# Patient Record
Sex: FEMALE | Race: WHITE | NOT HISPANIC OR LATINO | Employment: PART TIME | URBAN - METROPOLITAN AREA
[De-identification: names, ages, dates, MRNs, and addresses within clinical notes are randomized per-mention and may not be internally consistent; named-entity substitution may affect disease eponyms.]

---

## 2021-03-22 ENCOUNTER — BULK ORDERING (OUTPATIENT)
Dept: CASE MANAGEMENT | Facility: OTHER | Age: 61
End: 2021-03-22

## 2021-03-22 DIAGNOSIS — Z23 IMMUNIZATION DUE: ICD-10-CM

## 2021-05-21 ENCOUNTER — OFFICE VISIT (OUTPATIENT)
Dept: FAMILY MEDICINE CLINIC | Facility: CLINIC | Age: 61
End: 2021-05-21

## 2021-05-21 ENCOUNTER — HOSPITAL ENCOUNTER (OUTPATIENT)
Dept: GENERAL RADIOLOGY | Facility: HOSPITAL | Age: 61
Discharge: HOME | End: 2021-05-21

## 2021-05-21 VITALS
OXYGEN SATURATION: 100 % | BODY MASS INDEX: 25.76 KG/M2 | HEIGHT: 68 IN | DIASTOLIC BLOOD PRESSURE: 70 MMHG | SYSTOLIC BLOOD PRESSURE: 110 MMHG | RESPIRATION RATE: 16 BRPM | TEMPERATURE: 98.4 F | WEIGHT: 170 LBS | HEART RATE: 72 BPM

## 2021-05-21 DIAGNOSIS — R10.10 PAIN OF UPPER ABDOMEN: ICD-10-CM

## 2021-05-21 DIAGNOSIS — F10.20 ALCOHOLIC (HCC): Primary | ICD-10-CM

## 2021-05-21 DIAGNOSIS — R05.9 COUGH: ICD-10-CM

## 2021-05-21 DIAGNOSIS — F39 MOOD DISORDER (HCC): ICD-10-CM

## 2021-05-21 DIAGNOSIS — F17.219 CIGARETTE NICOTINE DEPENDENCE WITH NICOTINE-INDUCED DISORDER: ICD-10-CM

## 2021-05-21 DIAGNOSIS — R14.0 ABDOMINAL DISTENTION: ICD-10-CM

## 2021-05-21 DIAGNOSIS — R14.0 BLOATING: ICD-10-CM

## 2021-05-21 DIAGNOSIS — Z11.59 NEED FOR HEPATITIS C SCREENING TEST: ICD-10-CM

## 2021-05-21 PROBLEM — M85.80 OSTEOPENIA: Status: ACTIVE | Noted: 2021-05-21

## 2021-05-21 PROCEDURE — 74019 RADEX ABDOMEN 2 VIEWS: CPT

## 2021-05-21 PROCEDURE — 99204 OFFICE O/P NEW MOD 45 MIN: CPT | Performed by: NURSE PRACTITIONER

## 2021-05-21 PROCEDURE — 71046 X-RAY EXAM CHEST 2 VIEWS: CPT

## 2021-05-21 NOTE — PATIENT INSTRUCTIONS
Discussed AA and JDAC, given numbers to both  Need to slow down alcohol intake and then stop alcohol intake  Discussed depression and sleep issues, will need to further discuss and start treatment; need to seek therapist  Will need to have fasting labs and imaging done for full evaluation.   Discussed smoking cessation and possible medications  Need to follow up after completion of labs and imaging for review of all.  Pt verb. Understanding.

## 2021-05-21 NOTE — PROGRESS NOTES
Oni Stack is a 60 y.o.. female.     Pt here today to become established.     Pt here with c/o bloating and abd. Pain. Pt here with c/o depression and sleep issues. Pt admits to drinking alcohol daily. Pt stating some days she drinks one bottle of wine by herself. Pt stating some times sh drinks burbon. Pt stating she has had an issue with alcohol for some time. Pt stating her brother suddenly passed about 6 months ago and she has been feeling more depressed since then. Pt stating she has noticed she has been gaining weight and is concerned. Pt stating she feels lonely since her brother has .     21 Cologaurd negative  2020 mammogram negative  2020 dexa scan done, showed osteopenia   2021 last GYN appt.       The following portions of the patient's history were reviewed and updated as appropriate: allergies, current medications, past family history, past medical history, past social history, past surgical history and problem list.    Past Medical History:   Diagnosis Date   • Alcoholic (CMS/[facility]) 2021   • Cigarette nicotine dependence with nicotine-induced disorder 2021   • Mood disorder (CMS/HCC) 2021   • Osteopenia 2021       Past Surgical History:   Procedure Laterality Date   •  SECTION             Review of Systems   Constitutional: Negative for activity change, appetite change and fatigue.   HENT: Positive for congestion. Negative for ear pain, rhinorrhea and sore throat.    Respiratory: Positive for cough. Negative for shortness of breath.    Cardiovascular: Negative for chest pain and palpitations.   Gastrointestinal: Positive for abdominal pain. Negative for blood in stool, diarrhea, nausea and vomiting.   Genitourinary: Negative.  Negative for dysuria, frequency, hematuria and urgency.   Neurological: Negative for dizziness and headaches.   Psychiatric/Behavioral: Positive for dysphoric mood and sleep disturbance. Negative for decreased  "concentration. The patient is not nervous/anxious.        No Known Allergies    Objective     Vitals:    05/21/21 1443   BP: 110/70   Pulse: 72   Resp: 16   Temp: 98.4 °F (36.9 °C)   TempSrc: Oral   SpO2: 100%   Weight: 77.1 kg (170 lb)   Height: 172.7 cm (68\")     Body mass index is 25.85 kg/m².    Physical Exam  Vitals reviewed.   HENT:      Head: Normocephalic.   Eyes:      Pupils: Pupils are equal, round, and reactive to light.   Cardiovascular:      Rate and Rhythm: Normal rate and regular rhythm.   Pulmonary:      Effort: Pulmonary effort is normal.      Breath sounds: Normal breath sounds.   Abdominal:      General: Bowel sounds are normal. There is distension.      Palpations: Abdomen is soft. There is no hepatomegaly or splenomegaly.      Tenderness: There is no abdominal tenderness. There is no guarding or rebound. Negative signs include McBurney's sign.      Hernia: No hernia is present.   Musculoskeletal:         General: Normal range of motion.   Skin:     General: Skin is warm and dry.   Neurological:      Mental Status: She is alert and oriented to person, place, and time.         No current outpatient medications on file.      Assessment/Plan   Diagnoses and all orders for this visit:    1. Alcoholic (CMS/HCC) (Primary)    2. Pain of upper abdomen  -     CBC & Differential  -     Comprehensive metabolic panel  -     Lipid Panel  -     Urine Culture - Urine, Urine, Clean Catch  -     Amylase  -     Lipase  -     Hemoglobin A1c  -     XR abdomen flat and upright; Future    3. Bloating    4. Abdominal distention    5. Mood disorder (CMS/HCC)  -     Vitamin D 25 Hydroxy  -     Vitamin B12  -     TSH    6. Cigarette nicotine dependence with nicotine-induced disorder  -     XR Chest PA & Lateral; Future    7. Cough  -     XR Chest PA & Lateral; Future    8. Need for hepatitis C screening test  -     Hepatitis C antibody        Patient Instructions   Discussed AA and JDAC, given numbers to both  Need to " slow down alcohol intake and then stop alcohol intake  Discussed depression and sleep issues, will need to further discuss and start treatment; need to seek therapist  Will need to have fasting labs and imaging done for full evaluation.   Discussed smoking cessation and possible medications  Need to follow up after completion of labs and imaging for review of all.  Pt verb. Understanding.       Return for schedule fasting labs (already placed in computer); follow up in one week.

## 2021-05-27 LAB
25(OH)D3+25(OH)D2 SERPL-MCNC: 28.4 NG/ML (ref 30–100)
ALBUMIN SERPL-MCNC: 4.1 G/DL (ref 3.5–5.2)
ALBUMIN/GLOB SERPL: 2.1 G/DL
ALP SERPL-CCNC: 81 U/L (ref 39–117)
ALT SERPL-CCNC: 22 U/L (ref 1–33)
AMYLASE SERPL-CCNC: 45 U/L (ref 28–100)
AST SERPL-CCNC: 16 U/L (ref 1–32)
BACTERIA UR CULT: NORMAL
BACTERIA UR CULT: NORMAL
BASOPHILS # BLD AUTO: 0.08 10*3/MM3 (ref 0–0.2)
BASOPHILS NFR BLD AUTO: 0.8 % (ref 0–1.5)
BILIRUB SERPL-MCNC: 0.6 MG/DL (ref 0–1.2)
BUN SERPL-MCNC: 13 MG/DL (ref 8–23)
BUN/CREAT SERPL: 21.3 (ref 7–25)
CALCIUM SERPL-MCNC: 9.2 MG/DL (ref 8.6–10.5)
CHLORIDE SERPL-SCNC: 105 MMOL/L (ref 98–107)
CHOLEST SERPL-MCNC: 216 MG/DL (ref 0–200)
CO2 SERPL-SCNC: 23.2 MMOL/L (ref 22–29)
CREAT SERPL-MCNC: 0.61 MG/DL (ref 0.57–1)
EOSINOPHIL # BLD AUTO: 0.22 10*3/MM3 (ref 0–0.4)
EOSINOPHIL NFR BLD AUTO: 2.3 % (ref 0.3–6.2)
ERYTHROCYTE [DISTWIDTH] IN BLOOD BY AUTOMATED COUNT: 12.2 % (ref 12.3–15.4)
GLOBULIN SER CALC-MCNC: 2 GM/DL
GLUCOSE SERPL-MCNC: 103 MG/DL (ref 65–99)
HBA1C MFR BLD: 4.8 % (ref 4.8–5.6)
HCT VFR BLD AUTO: 42.9 % (ref 34–46.6)
HCV AB S/CO SERPL IA: <0.1 S/CO RATIO (ref 0–0.9)
HDLC SERPL-MCNC: 51 MG/DL (ref 40–60)
HGB BLD-MCNC: 14 G/DL (ref 12–15.9)
IMM GRANULOCYTES # BLD AUTO: 0.03 10*3/MM3 (ref 0–0.05)
IMM GRANULOCYTES NFR BLD AUTO: 0.3 % (ref 0–0.5)
LDLC SERPL CALC-MCNC: 145 MG/DL (ref 0–100)
LIPASE SERPL-CCNC: 34 U/L (ref 13–60)
LYMPHOCYTES # BLD AUTO: 2.5 10*3/MM3 (ref 0.7–3.1)
LYMPHOCYTES NFR BLD AUTO: 25.7 % (ref 19.6–45.3)
MCH RBC QN AUTO: 32.4 PG (ref 26.6–33)
MCHC RBC AUTO-ENTMCNC: 32.6 G/DL (ref 31.5–35.7)
MCV RBC AUTO: 99.3 FL (ref 79–97)
MONOCYTES # BLD AUTO: 0.69 10*3/MM3 (ref 0.1–0.9)
MONOCYTES NFR BLD AUTO: 7.1 % (ref 5–12)
NEUTROPHILS # BLD AUTO: 6.19 10*3/MM3 (ref 1.7–7)
NEUTROPHILS NFR BLD AUTO: 63.8 % (ref 42.7–76)
NRBC BLD AUTO-RTO: 0 /100 WBC (ref 0–0.2)
PLATELET # BLD AUTO: 298 10*3/MM3 (ref 140–450)
POTASSIUM SERPL-SCNC: 4.2 MMOL/L (ref 3.5–5.2)
PROT SERPL-MCNC: 6.1 G/DL (ref 6–8.5)
RBC # BLD AUTO: 4.32 10*6/MM3 (ref 3.77–5.28)
SODIUM SERPL-SCNC: 138 MMOL/L (ref 136–145)
TRIGL SERPL-MCNC: 112 MG/DL (ref 0–150)
TSH SERPL DL<=0.005 MIU/L-ACNC: 3.05 UIU/ML (ref 0.27–4.2)
VIT B12 SERPL-MCNC: 293 PG/ML (ref 211–946)
VLDLC SERPL CALC-MCNC: 20 MG/DL (ref 5–40)
WBC # BLD AUTO: 9.71 10*3/MM3 (ref 3.4–10.8)

## 2021-05-28 ENCOUNTER — OFFICE VISIT (OUTPATIENT)
Dept: FAMILY MEDICINE CLINIC | Facility: CLINIC | Age: 61
End: 2021-05-28

## 2021-05-28 VITALS
OXYGEN SATURATION: 99 % | BODY MASS INDEX: 25.61 KG/M2 | WEIGHT: 169 LBS | RESPIRATION RATE: 18 BRPM | HEART RATE: 76 BPM | SYSTOLIC BLOOD PRESSURE: 118 MMHG | HEIGHT: 68 IN | TEMPERATURE: 97.1 F | DIASTOLIC BLOOD PRESSURE: 62 MMHG

## 2021-05-28 DIAGNOSIS — Z71.6 ENCOUNTER FOR SMOKING CESSATION COUNSELING: ICD-10-CM

## 2021-05-28 DIAGNOSIS — F10.20 ALCOHOLIC (HCC): Primary | ICD-10-CM

## 2021-05-28 DIAGNOSIS — F39 MOOD DISORDER (HCC): ICD-10-CM

## 2021-05-28 DIAGNOSIS — E55.9 VITAMIN D DEFICIENCY: ICD-10-CM

## 2021-05-28 DIAGNOSIS — E78.2 MIXED HYPERLIPIDEMIA: ICD-10-CM

## 2021-05-28 PROCEDURE — 99213 OFFICE O/P EST LOW 20 MIN: CPT | Performed by: NURSE PRACTITIONER

## 2021-05-28 RX ORDER — VARENICLINE TARTRATE 1 MG/1
1 TABLET, FILM COATED ORAL 2 TIMES DAILY
Qty: 56 TABLET | Refills: 4 | Status: SHIPPED | OUTPATIENT
Start: 2021-06-25 | End: 2021-11-12

## 2021-05-28 RX ORDER — ERGOCALCIFEROL 1.25 MG/1
50000 CAPSULE ORAL WEEKLY
Qty: 5 CAPSULE | Refills: 2 | Status: SHIPPED | OUTPATIENT
Start: 2021-05-28 | End: 2021-06-22 | Stop reason: SDUPTHER

## 2021-06-01 PROBLEM — E55.9 VITAMIN D DEFICIENCY: Status: ACTIVE | Noted: 2021-06-01

## 2021-06-01 PROBLEM — E78.2 MIXED HYPERLIPIDEMIA: Status: ACTIVE | Noted: 2021-06-01

## 2021-06-01 NOTE — PATIENT INSTRUCTIONS
Discussed all recent labs with pt in office today.    Alcoholic/mood disorder: Discussed Jdac/[facility] support [facility]; discussed medication-pt declined at this time; will re-discuss in 3 months.     HLD: need to work on diet, decrease saturated fats, increase exercise, increase water intake.    Vitamin D def.: Vitamin D supplement, will recheck in 3 months    Smoking cessation: Discussed Chantix, how to take, discussed smoking cessation

## 2021-06-22 DIAGNOSIS — E55.9 VITAMIN D DEFICIENCY: ICD-10-CM

## 2021-06-22 RX ORDER — ERGOCALCIFEROL 1.25 MG/1
50000 CAPSULE ORAL WEEKLY
Qty: 8 CAPSULE | Refills: 2 | Status: SHIPPED | OUTPATIENT
Start: 2021-06-22 | End: 2021-10-07 | Stop reason: SDUPTHER

## 2021-06-22 NOTE — TELEPHONE ENCOUNTER
Caller: Janel Stack    Relationship: Self    Best call back number: [phone]    Medication needed:   Requested Prescriptions     Pending Prescriptions Disp Refills   • vitamin D (ERGOCALCIFEROL) 1.25 MG (63730 UT) capsule capsule 5 capsule 2     Sig: Take 1 capsule by mouth 1 (One) Time Per Week.       When do you need the refill by: 6/22/21    What additional details did the patient provide when requesting the medication: PATIENT IS OUT OF MEDICATION. SHE ALSO ASKED IF SHE COULD GET A 2 MONTH SUPPLY OF VITAMIN D    Does the patient have less than a 3 day supply:  [x] Yes  [] No    What is the patient's preferred pharmacy: [facility] #[id number] [facility] [address] [address] AT [facility] [phone] [facility] [phone]

## 2021-07-16 ENCOUNTER — TELEPHONE (OUTPATIENT)
Dept: FAMILY MEDICINE CLINIC | Facility: CLINIC | Age: 61
End: 2021-07-16

## 2021-07-22 NOTE — TELEPHONE ENCOUNTER
7/22/21-3RD ATTEMPT-CALLED PT AND LEFT VM TO CB PER PT NEEDS APPT.    HUB TO READ:    PT NEEDS APPT WITH PCP FOR BACK PAIN ISSUE

## 2021-10-07 DIAGNOSIS — E55.9 VITAMIN D DEFICIENCY: ICD-10-CM

## 2021-10-07 RX ORDER — ERGOCALCIFEROL 1.25 MG/1
50000 CAPSULE ORAL WEEKLY
Qty: 8 CAPSULE | Refills: 2 | Status: SHIPPED | OUTPATIENT
Start: 2021-10-07 | End: 2022-08-19

## 2021-10-07 NOTE — TELEPHONE ENCOUNTER
Incoming Refill Request      Medication requested (name and dose): vitamin D (ERGOCALCIFEROL) 1.25 MG (25567 UT) capsule capsule    Pharmacy where request should be sent: [facility] #[id number] - [facility] [address] [address] AT [facility] [phone] [facility] [phone]

## 2021-10-07 NOTE — TELEPHONE ENCOUNTER
Florencia,     Please review and refill if appropriate.     Last OV 05/28/2021  Next OV none scheduled      Thanks,  Deven

## 2021-10-21 ENCOUNTER — TELEPHONE (OUTPATIENT)
Dept: FAMILY MEDICINE CLINIC | Facility: CLINIC | Age: 61
End: 2021-10-21

## 2021-10-21 NOTE — TELEPHONE ENCOUNTER
Caller: Janel Stack    Relationship: Self    Best call back number: [phone]    What medication are you requesting: VALIUM       Have you had these symptoms before:    [x] Yes  [] No    Have you been treated for these symptoms before:   [x] Yes  [] No    If a prescription is needed, what is your preferred pharmacy and phone number: [facility] #[id number] [facility] [address] [facility] AT [facility] [phone] [facility] [phone]      Additional notes: PATIENT STATES SHE IS HAVING A MOLAR REMOVED TOMORROW AT THE DENTIST AND HE ALWAYS RECOMMENDS HER PCP TO PRESCRIBE HER VALIUM FOR HER NERVES

## 2021-10-22 NOTE — TELEPHONE ENCOUNTER
If pt needing valium she will have to discuss with her dentist; if she is requesting us to provide medication she needs to come into office for appt. To discuss.   Florencia

## 2021-12-20 ENCOUNTER — TELEPHONE (OUTPATIENT)
Dept: FAMILY MEDICINE CLINIC | Facility: CLINIC | Age: 61
End: 2021-12-20

## 2021-12-20 NOTE — TELEPHONE ENCOUNTER
Caller: Janel Stack    Relationship: Self    Best call back number: [phone]    Requested Prescriptions:    ANTIBIOTIC (CLENDAMYACIN)  Pharmacy where request should be sent:      Additional details provided by patient: PATIENT IS CALLING TO STATE HER DENTIST PRESCRIBED THE ABOVE MEDICATION FOR AN INFECTED TOOTH THAT NEEDS TO BE PULLED.  SHE STATES IT IS GOING TO COST OVER $100 DUE TO THE FACT HER DENTIST IS NOT IN THE [facility] NETWORK.  SHE IS WANTING TO KNOW IF MS DOMINGO WOULD BE WILLING TO PRESCRIBE THE MEDICATION BECAUSE SHE IS IN NETWORK.    [facility] #[id number] [place] - [address] [address] AT [facility] - [phone] [place] [phone] [place]  [phone]    Does the patient have less than a 3 day supply:  [x] Yes  [] No    Va Jose, [employer] Rep   12/20/21 14:59 EST     PLEASE ADVISE.

## 2021-12-21 NOTE — TELEPHONE ENCOUNTER
CALLED PT AND LEFT VM TO CB PER NEEDS APPT FOR MEDICATION REQUEST    HUB TO READ:    PT WILL NEED APPT FOR ANY NEW MEDICATIONS

## 2022-01-03 ENCOUNTER — OFFICE VISIT (OUTPATIENT)
Dept: FAMILY MEDICINE CLINIC | Facility: CLINIC | Age: 62
End: 2022-01-03

## 2022-01-03 VITALS
HEART RATE: 68 BPM | SYSTOLIC BLOOD PRESSURE: 101 MMHG | WEIGHT: 68.6 LBS | HEIGHT: 68 IN | TEMPERATURE: 96.4 F | OXYGEN SATURATION: 99 % | RESPIRATION RATE: 18 BRPM | DIASTOLIC BLOOD PRESSURE: 58 MMHG | BODY MASS INDEX: 10.4 KG/M2

## 2022-01-03 DIAGNOSIS — F41.9 ANXIETY SYNDROME: Primary | ICD-10-CM

## 2022-01-03 PROCEDURE — 99213 OFFICE O/P EST LOW 20 MIN: CPT | Performed by: NURSE PRACTITIONER

## 2022-01-03 RX ORDER — DIAZEPAM 2 MG/1
2 TABLET ORAL 2 TIMES DAILY PRN
Qty: 2 TABLET | Refills: 0 | Status: SHIPPED | OUTPATIENT
Start: 2022-01-03 | End: 2022-08-19

## 2022-01-03 RX ORDER — CLINDAMYCIN HYDROCHLORIDE 300 MG/1
CAPSULE ORAL
COMMUNITY
Start: 2021-12-21 | End: 2022-08-19

## 2022-01-03 NOTE — PATIENT INSTRUCTIONS
ludwig pulled, reviewed and appropriate  Discussed that this is not a medication that will be prescribed for anxiety on a normal basis; that this is to help with her dental procedure anxiety. Pt informed of possible adverse effects/side effects of medication. Pt informed to make sure her dentist is aware of script and if she takes medication. Pt verb. Understanding.

## 2022-01-03 NOTE — PROGRESS NOTES
"Oni Stack is a 61 y.o.. female.     Pt here today with request for a script for valium for dental procedure anxiety. Pt stating she is having her right molar pulled on 21.       The following portions of the patient's history were reviewed and updated as appropriate: allergies, current medications, past family history, past medical history, past social history, past surgical history and problem list.    Past Medical History:   Diagnosis Date   • Alcoholic (CMS/HCC) 2021   • Cigarette nicotine dependence with nicotine-induced disorder 2021   • Mood disorder (CMS/HCC) 2021   • Osteopenia 2021       Past Surgical History:   Procedure Laterality Date   •  SECTION             Review of Systems   Constitutional: Negative.    Respiratory: Negative.    Cardiovascular: Negative.        No Known Allergies    Objective     Vitals:    22 1511   BP: 101/58   BP Location: Left arm   Patient Position: Sitting   Pulse: 68   Resp: 18   Temp: 96.4 °F (35.8 °C)   TempSrc: Oral   SpO2: 99%   Weight: 31.1 kg (68 lb 9.6 oz)   Height: 172.7 cm (67.99\")     Body mass index is 10.43 kg/m².    Physical Exam  Vitals reviewed.   HENT:      Head: Normocephalic.   Eyes:      Pupils: Pupils are equal, round, and reactive to light.   Cardiovascular:      Rate and Rhythm: Normal rate and regular rhythm.   Pulmonary:      Effort: Pulmonary effort is normal.      Breath sounds: Normal breath sounds.   Musculoskeletal:         General: Normal range of motion.   Neurological:      Mental Status: She is alert and oriented to person, place, and time.   Psychiatric:         Behavior: Behavior normal.         Current Outpatient Medications:   •  clindamycin (CLEOCIN) 300 MG capsule, , Disp: , Rfl:   •  diazePAM (Valium) 2 MG tablet, Take 1 tablet by mouth 2 (Two) Times a Day As Needed for Anxiety., Disp: 2 tablet, Rfl: 0  •  vitamin D (ERGOCALCIFEROL) 1.25 MG (15578 UT) capsule capsule, Take 1 " capsule by mouth 1 (One) Time Per Week., Disp: 8 capsule, Rfl: 2      Assessment/Plan   Diagnoses and all orders for this visit:    1. Anxiety syndrome (Primary)  Comments:  dental procedure, haviing molar pulled  Orders:  -     diazePAM (Valium) 2 MG tablet; Take 1 tablet by mouth 2 (Two) Times a Day As Needed for Anxiety.  Dispense: 2 tablet; Refill: 0        Patient Instructions   ludwig pulled, reviewed and appropriate  Discussed that this is not a medication that will be prescribed for anxiety on a normal basis; that this is to help with her dental procedure anxiety. Pt informed of possible adverse effects/side effects of medication. Pt informed to make sure her dentist is aware of script and if she takes medication. Pt verb. Understanding.       Return for follow up as needed/as recommended.

## 2022-08-18 ENCOUNTER — OFFICE VISIT (OUTPATIENT)
Dept: FAMILY MEDICINE CLINIC | Facility: CLINIC | Age: 62
End: 2022-08-18

## 2022-08-19 ENCOUNTER — TELEPHONE (OUTPATIENT)
Dept: FAMILY MEDICINE CLINIC | Facility: CLINIC | Age: 62
End: 2022-08-19

## 2022-08-19 ENCOUNTER — OFFICE VISIT (OUTPATIENT)
Dept: FAMILY MEDICINE CLINIC | Facility: CLINIC | Age: 62
End: 2022-08-19

## 2022-08-19 VITALS
SYSTOLIC BLOOD PRESSURE: 126 MMHG | BODY MASS INDEX: 25.31 KG/M2 | DIASTOLIC BLOOD PRESSURE: 70 MMHG | RESPIRATION RATE: 18 BRPM | OXYGEN SATURATION: 98 % | HEIGHT: 68 IN | TEMPERATURE: 98 F | WEIGHT: 167 LBS | HEART RATE: 65 BPM

## 2022-08-19 DIAGNOSIS — F10.20 ALCOHOLIC: ICD-10-CM

## 2022-08-19 DIAGNOSIS — G89.29 CHRONIC BILATERAL LOW BACK PAIN WITH BILATERAL SCIATICA: ICD-10-CM

## 2022-08-19 DIAGNOSIS — M54.42 CHRONIC BILATERAL LOW BACK PAIN WITH BILATERAL SCIATICA: ICD-10-CM

## 2022-08-19 DIAGNOSIS — Z00.00 ANNUAL PHYSICAL EXAM: Primary | ICD-10-CM

## 2022-08-19 DIAGNOSIS — F41.9 ANXIETY SYNDROME: ICD-10-CM

## 2022-08-19 DIAGNOSIS — M54.41 CHRONIC BILATERAL LOW BACK PAIN WITH BILATERAL SCIATICA: ICD-10-CM

## 2022-08-19 PROCEDURE — 99396 PREV VISIT EST AGE 40-64: CPT | Performed by: NURSE PRACTITIONER

## 2022-08-19 RX ORDER — TIZANIDINE HYDROCHLORIDE 2 MG/1
2 CAPSULE, GELATIN COATED ORAL 3 TIMES DAILY PRN
Qty: 30 CAPSULE | Refills: 0 | Status: SHIPPED | OUTPATIENT
Start: 2022-08-19 | End: 2022-08-19 | Stop reason: SDUPTHER

## 2022-08-19 RX ORDER — ERGOCALCIFEROL (VITAMIN D2) 10 MCG
400 TABLET ORAL DAILY
COMMUNITY

## 2022-08-19 RX ORDER — METHYLPREDNISOLONE 4 MG/1
TABLET ORAL
Qty: 1 EACH | Refills: 0 | Status: SHIPPED | OUTPATIENT
Start: 2022-08-19 | End: 2022-08-19 | Stop reason: SDUPTHER

## 2022-08-19 RX ORDER — METHYLPREDNISOLONE 4 MG/1
TABLET ORAL
Qty: 1 EACH | Refills: 0 | Status: SHIPPED | OUTPATIENT
Start: 2022-08-19 | End: 2023-01-04

## 2022-08-19 RX ORDER — TIZANIDINE HYDROCHLORIDE 2 MG/1
2 CAPSULE, GELATIN COATED ORAL 3 TIMES DAILY PRN
Qty: 30 CAPSULE | Refills: 0 | Status: SHIPPED | OUTPATIENT
Start: 2022-08-19 | End: 2022-08-23

## 2022-08-19 NOTE — TELEPHONE ENCOUNTER
Caller: Janel Stack    Relationship: Self    Best call back number: [phone]     What medications are you currently taking:   Current Outpatient Medications on File Prior to Visit   Medication Sig Dispense Refill   • methylPREDNISolone (MEDROL) 4 MG dose pack Take as directed on package instructions. 1 each 0   • TiZANidine (Zanaflex) 2 MG capsule Take 1 capsule by mouth 3 (Three) Times a Day As Needed for Muscle Spasms. 30 capsule 0   • Vitamin D, Cholecalciferol, (CHOLECALCIFEROL) 10 MCG (400 UNIT) tablet Take 400 Units by mouth Daily.     • [DISCONTINUED] clindamycin (CLEOCIN) 300 MG capsule      • [DISCONTINUED] diazePAM (Valium) 2 MG tablet Take 1 tablet by mouth 2 (Two) Times a Day As Needed for Anxiety. 2 tablet 0   • [DISCONTINUED] methylPREDNISolone (MEDROL) 4 MG dose pack Take as directed on package instructions. 1 each 0   • [DISCONTINUED] TiZANidine (Zanaflex) 2 MG capsule Take 1 capsule by mouth 3 (Three) Times a Day As Needed for Muscle Spasms. 30 capsule 0   • [DISCONTINUED] vitamin D (ERGOCALCIFEROL) 1.25 MG (21580 UT) capsule capsule Take 1 capsule by mouth 1 (One) Time Per Week. 8 capsule 2     No current facility-administered medications on file prior to visit.        Which medication are you concerned about: TiZANidine (Zanaflex) 2 MG capsule [19817] (Order [id number])    What are your concerns: PHARMACY REQUESTING PRIOR AUTHORIZATION.    PHARMACY: [facility] #[id number] [facility] [address] [address] AT [facility] [phone] [facility] [phone]

## 2022-08-19 NOTE — PROGRESS NOTES
Subjective     Janel Stack is a 62 y.o.. female.     Pt here today for annual physical.     Pt stating she is eating healthy, not drinking much water, drinks coffee in am, at least 1 energy drink during day, and 1 carbonated water during day. Pt stating she normally plays tennis 3-4 times a week until 2 months ago when her back pain started getting worse.    Pt stating she is post menopausal, last GYN appt was at the beginning of , had dexa and mammogram done at that time. Pt stating she will have results sent over to our office.    Pt had cologaurd done 21 which was negative.     Pt stating she works at [employer].    Pt stating she is drinking alcohol about 3 times a week; normally white claw or wine (2 glasses at a time)    Pt c/o worsening back pain for 2 months; has history of chronic back pain. Pt stating she has been going to her chiropractor and denies improvement. Pt denies any injury and/or trauma.      The following portions of the patient's history were reviewed and updated as appropriate: allergies, current medications, past family history, past medical history, past social history, past surgical history and problem list.    Past Medical History:   Diagnosis Date   • Alcoholic (HCC) 2021   • Cigarette nicotine dependence with nicotine-induced disorder 2021   • Mood disorder (HCC) 2021   • Osteopenia 2021       Past Surgical History:   Procedure Laterality Date   •  SECTION             Review of Systems   Constitutional: Negative.    Respiratory: Negative.    Cardiovascular: Negative.    Genitourinary: Negative.    Musculoskeletal: Positive for back pain and gait problem.        Has been falling over left foot   Neurological: Positive for numbness (in buttocks).       No Known Allergies    Objective     Vitals:    22 0911   BP: 126/70   Pulse: 65   Resp: 18   Temp: 98 °F (36.7 °C)   TempSrc: Temporal   SpO2: 98%   Weight: 75.8 kg (167 lb)   Height: 172.7  "cm (67.99\")     Body mass index is 25.4 kg/m².    Physical Exam  Constitutional:       Appearance: Normal appearance. She is well-developed.   HENT:      Head: Normocephalic and atraumatic.      Right Ear: Tympanic membrane normal. Tympanic membrane is not erythematous.      Left Ear: Tympanic membrane normal. Tympanic membrane is not erythematous.      Nose: Nose normal.   Eyes:      Conjunctiva/sclera: Conjunctivae normal.      Pupils: Pupils are equal, round, and reactive to light.   Neck:      Thyroid: No thyromegaly.      Vascular: No carotid bruit.      Trachea: No tracheal deviation.   Cardiovascular:      Rate and Rhythm: Normal rate and regular rhythm.      Pulses: Normal pulses.      Heart sounds: Normal heart sounds. No murmur heard.  Pulmonary:      Effort: No accessory muscle usage or respiratory distress.      Breath sounds: Normal breath sounds. No stridor. No decreased breath sounds, wheezing, rhonchi or rales.   Abdominal:      General: Bowel sounds are normal. There is no distension.      Palpations: Abdomen is soft. Abdomen is not rigid. There is no mass.      Tenderness: There is no abdominal tenderness. There is no guarding or rebound.      Hernia: No hernia is present.   Musculoskeletal:      Cervical back: Normal range of motion and neck supple.      Lumbar back: Tenderness and bony tenderness present. No swelling, edema or deformity. Decreased range of motion. Positive right straight leg raise test and positive left straight leg raise test.   Lymphadenopathy:      Cervical: No cervical adenopathy.   Skin:     General: Skin is warm and dry.      Capillary Refill: Capillary refill takes 2 to 3 seconds.   Neurological:      Mental Status: She is alert and oriented to person, place, and time.      Cranial Nerves: No cranial nerve deficit.      Sensory: No sensory deficit.      Motor: No abnormal muscle tone.      Coordination: Coordination normal.   Psychiatric:         Speech: Speech normal.    "      Behavior: Behavior normal.           Current Outpatient Medications:   •  methylPREDNISolone (MEDROL) 4 MG dose pack, Take as directed on package instructions., Disp: 1 each, Rfl: 0  •  TiZANidine (Zanaflex) 2 MG capsule, Take 1 capsule by mouth 3 (Three) Times a Day As Needed for Muscle Spasms., Disp: 30 capsule, Rfl: 0  •  Vitamin D, Cholecalciferol, (CHOLECALCIFEROL) 10 MCG (400 UNIT) tablet, Take 400 Units by mouth Daily., Disp: , Rfl:     No results found for this or any previous visit (from the past 2016 hour(s)).      Diagnoses and all orders for this visit:    1. Annual physical exam (Primary)  -     Lipid Panel With LDL / HDL Ratio  -     CBC & Differential  -     Comprehensive Metabolic Panel  -     Urinalysis With Culture If Indicated -    2. Chronic bilateral low back pain with bilateral sciatica  -     XR Spine Lumbar 2 or 3 View; Future  -     Discontinue: methylPREDNISolone (MEDROL) 4 MG dose pack; Take as directed on package instructions.  Dispense: 1 each; Refill: 0  -     Discontinue: TiZANidine (Zanaflex) 2 MG capsule; Take 1 capsule by mouth 3 (Three) Times a Day As Needed for Muscle Spasms.  Dispense: 30 capsule; Refill: 0  -     methylPREDNISolone (MEDROL) 4 MG dose pack; Take as directed on package instructions.  Dispense: 1 each; Refill: 0  -     TiZANidine (Zanaflex) 2 MG capsule; Take 1 capsule by mouth 3 (Three) Times a Day As Needed for Muscle Spasms.  Dispense: 30 capsule; Refill: 0    3. Alcoholic (HCC)    4. Anxiety syndrome        Patient Instructions   Low back pain: May use cold compress/ice pack 10-15 minutes at a time several times a day; May use warm compress/heating pad 10-15 minutes at at time several times a day; May use over the counter biofreeze as needed (wash off from skin before using heating pad; Ordering lumbar spine xray but believe we will have to do MRI for full workup due to her positive straight leg test elma. And her involvement with left foot.     Alcoholism:  improvement per pt; continue to work on reducing alcoholic intake and stopping alcohol.     Anxiety: resolved, continue to monitor    Recommend eating a heart healthy diet, drink plenty of water with goal 64 oz a day        Return if symptoms worsen or fail to improve.

## 2022-08-19 NOTE — PATIENT INSTRUCTIONS
Low back pain: May use cold compress/ice pack 10-15 minutes at a time several times a day; May use warm compress/heating pad 10-15 minutes at at time several times a day; May use over the counter biofreeze as needed (wash off from skin before using heating pad; Ordering lumbar spine xray but believe we will have to do MRI for full workup due to her positive straight leg test elma. And her involvement with left foot.     Alcoholism: improvement per pt; continue to work on reducing alcoholic intake and stopping alcohol.     Anxiety: resolved, continue to monitor    Recommend eating a heart healthy diet, drink plenty of water with goal 64 oz a day

## 2022-08-21 LAB
ALBUMIN SERPL-MCNC: 4.4 G/DL (ref 3.8–4.8)
ALBUMIN/GLOB SERPL: 1.8 {RATIO} (ref 1.2–2.2)
ALP SERPL-CCNC: 93 IU/L (ref 44–121)
ALT SERPL-CCNC: 25 IU/L (ref 0–32)
APPEARANCE UR: CLEAR
AST SERPL-CCNC: 18 IU/L (ref 0–40)
BACTERIA #/AREA URNS HPF: NORMAL /[HPF]
BACTERIA UR CULT: ABNORMAL
BACTERIA UR CULT: ABNORMAL
BASOPHILS # BLD AUTO: 0.1 X10E3/UL (ref 0–0.2)
BASOPHILS NFR BLD AUTO: 1 %
BILIRUB SERPL-MCNC: 0.3 MG/DL (ref 0–1.2)
BILIRUB UR QL STRIP: NEGATIVE
BUN SERPL-MCNC: 16 MG/DL (ref 8–27)
BUN/CREAT SERPL: 30 (ref 12–28)
CALCIUM SERPL-MCNC: 9.5 MG/DL (ref 8.7–10.3)
CASTS URNS QL MICRO: NORMAL /LPF
CHLORIDE SERPL-SCNC: 103 MMOL/L (ref 96–106)
CHOLEST SERPL-MCNC: 240 MG/DL (ref 100–199)
CO2 SERPL-SCNC: 23 MMOL/L (ref 20–29)
COLOR UR: YELLOW
CREAT SERPL-MCNC: 0.53 MG/DL (ref 0.57–1)
EGFRCR-CYS SERPLBLD CKD-EPI 2021: 104 ML/MIN/1.73
EOSINOPHIL # BLD AUTO: 0.4 X10E3/UL (ref 0–0.4)
EOSINOPHIL NFR BLD AUTO: 4 %
EPI CELLS #/AREA URNS HPF: NORMAL /HPF (ref 0–10)
ERYTHROCYTE [DISTWIDTH] IN BLOOD BY AUTOMATED COUNT: 12 % (ref 11.7–15.4)
GLOBULIN SER CALC-MCNC: 2.4 G/DL (ref 1.5–4.5)
GLUCOSE SERPL-MCNC: 100 MG/DL (ref 65–99)
GLUCOSE UR QL STRIP: NEGATIVE
HCT VFR BLD AUTO: 42 % (ref 34–46.6)
HDLC SERPL-MCNC: 54 MG/DL
HGB BLD-MCNC: 14 G/DL (ref 11.1–15.9)
HGB UR QL STRIP: NEGATIVE
IMM GRANULOCYTES # BLD AUTO: 0 X10E3/UL (ref 0–0.1)
IMM GRANULOCYTES NFR BLD AUTO: 0 %
KETONES UR QL STRIP: NEGATIVE
LDLC SERPL CALC-MCNC: 171 MG/DL (ref 0–99)
LDLC/HDLC SERPL: 3.2 RATIO (ref 0–3.2)
LEUKOCYTE ESTERASE UR QL STRIP: ABNORMAL
LYMPHOCYTES # BLD AUTO: 2.6 X10E3/UL (ref 0.7–3.1)
LYMPHOCYTES NFR BLD AUTO: 29 %
MCH RBC QN AUTO: 33 PG (ref 26.6–33)
MCHC RBC AUTO-ENTMCNC: 33.3 G/DL (ref 31.5–35.7)
MCV RBC AUTO: 99 FL (ref 79–97)
MICRO URNS: ABNORMAL
MONOCYTES # BLD AUTO: 0.8 X10E3/UL (ref 0.1–0.9)
MONOCYTES NFR BLD AUTO: 9 %
NEUTROPHILS # BLD AUTO: 5.1 X10E3/UL (ref 1.4–7)
NEUTROPHILS NFR BLD AUTO: 57 %
NITRITE UR QL STRIP: NEGATIVE
PH UR STRIP: 6 [PH] (ref 5–7.5)
PLATELET # BLD AUTO: 309 X10E3/UL (ref 150–450)
POTASSIUM SERPL-SCNC: 5 MMOL/L (ref 3.5–5.2)
PROT SERPL-MCNC: 6.8 G/DL (ref 6–8.5)
PROT UR QL STRIP: NEGATIVE
RBC # BLD AUTO: 4.24 X10E6/UL (ref 3.77–5.28)
RBC #/AREA URNS HPF: NORMAL /HPF (ref 0–2)
SODIUM SERPL-SCNC: 141 MMOL/L (ref 134–144)
SP GR UR STRIP: 1.02 (ref 1–1.03)
TRIGL SERPL-MCNC: 85 MG/DL (ref 0–149)
URINALYSIS REFLEX: ABNORMAL
UROBILINOGEN UR STRIP-MCNC: 1 MG/DL (ref 0.2–1)
VLDLC SERPL CALC-MCNC: 15 MG/DL (ref 5–40)
WBC # BLD AUTO: 9 X10E3/UL (ref 3.4–10.8)
WBC #/AREA URNS HPF: NORMAL /HPF (ref 0–5)

## 2022-08-22 ENCOUNTER — HOSPITAL ENCOUNTER (OUTPATIENT)
Dept: GENERAL RADIOLOGY | Facility: HOSPITAL | Age: 62
Discharge: HOME | End: 2022-08-22
Admitting: NURSE PRACTITIONER

## 2022-08-22 DIAGNOSIS — M54.41 CHRONIC BILATERAL LOW BACK PAIN WITH BILATERAL SCIATICA: ICD-10-CM

## 2022-08-22 DIAGNOSIS — G89.29 CHRONIC BILATERAL LOW BACK PAIN WITH BILATERAL SCIATICA: ICD-10-CM

## 2022-08-22 DIAGNOSIS — M54.42 CHRONIC BILATERAL LOW BACK PAIN WITH BILATERAL SCIATICA: ICD-10-CM

## 2022-08-22 PROCEDURE — 72100 X-RAY EXAM L-S SPINE 2/3 VWS: CPT

## 2022-08-23 DIAGNOSIS — E78.2 MIXED HYPERLIPIDEMIA: Primary | ICD-10-CM

## 2022-08-23 DIAGNOSIS — M54.42 CHRONIC BILATERAL LOW BACK PAIN WITH BILATERAL SCIATICA: ICD-10-CM

## 2022-08-23 DIAGNOSIS — M54.41 CHRONIC BILATERAL LOW BACK PAIN WITH BILATERAL SCIATICA: ICD-10-CM

## 2022-08-23 DIAGNOSIS — G89.29 CHRONIC BILATERAL LOW BACK PAIN WITH BILATERAL SCIATICA: ICD-10-CM

## 2022-08-23 RX ORDER — ATORVASTATIN CALCIUM 10 MG/1
10 TABLET, FILM COATED ORAL DAILY
Qty: 90 TABLET | Refills: 1 | Status: SHIPPED | OUTPATIENT
Start: 2022-08-23

## 2022-08-23 RX ORDER — METAXALONE 800 MG/1
800 TABLET ORAL 3 TIMES DAILY PRN
Qty: 30 TABLET | Refills: 0 | Status: SHIPPED | OUTPATIENT
Start: 2022-08-23 | End: 2022-08-26

## 2022-08-26 DIAGNOSIS — M54.41 CHRONIC BILATERAL LOW BACK PAIN WITH BILATERAL SCIATICA: Primary | ICD-10-CM

## 2022-08-26 DIAGNOSIS — M54.42 CHRONIC BILATERAL LOW BACK PAIN WITH BILATERAL SCIATICA: Primary | ICD-10-CM

## 2022-08-26 DIAGNOSIS — G89.29 CHRONIC BILATERAL LOW BACK PAIN WITH BILATERAL SCIATICA: Primary | ICD-10-CM

## 2022-08-26 RX ORDER — TIZANIDINE 2 MG/1
2 TABLET ORAL EVERY 8 HOURS PRN
Qty: 30 TABLET | Refills: 0 | Status: SHIPPED | OUTPATIENT
Start: 2022-08-26 | End: 2023-01-04

## 2022-09-23 ENCOUNTER — TELEPHONE (OUTPATIENT)
Dept: FAMILY MEDICINE CLINIC | Facility: CLINIC | Age: 62
End: 2022-09-23

## 2022-09-23 NOTE — TELEPHONE ENCOUNTER
Caller: Janel Stack    Relationship to patient: Self    Best call back number:[phone]    Patient is needing: PATIENT WANTED TO APOLOGIZE FOR MISSING HER APPOINTMENT TODAY(9/23/22) AND WANTED TO LET ALEX KNOW THAT SHE DID NOT JUST NOT SHOW UP .SHE JUST WANTED HER TO KNOW HER CAR HAD BROKE THE NIGHT BEFORE AND SHE WAS SO OVERWHELMED WITH FIGURING OUT A RIDE FOR WORK THAT IT COMPLETELY SLIPPED HER MIND AND JUST WANTED TO APOLOGIZE FOR MISSING HER APPOINTMENT.

## 2023-01-04 ENCOUNTER — OFFICE VISIT (OUTPATIENT)
Dept: FAMILY MEDICINE CLINIC | Facility: CLINIC | Age: 63
End: 2023-01-04
Payer: COMMERCIAL

## 2023-01-04 VITALS
HEART RATE: 70 BPM | WEIGHT: 170 LBS | DIASTOLIC BLOOD PRESSURE: 62 MMHG | RESPIRATION RATE: 18 BRPM | TEMPERATURE: 97.8 F | BODY MASS INDEX: 25.76 KG/M2 | HEIGHT: 68 IN | OXYGEN SATURATION: 96 % | SYSTOLIC BLOOD PRESSURE: 112 MMHG

## 2023-01-04 DIAGNOSIS — M54.50 LUMBAR BACK PAIN: ICD-10-CM

## 2023-01-04 DIAGNOSIS — M54.16 LUMBAR RADICULOPATHY: Primary | ICD-10-CM

## 2023-01-04 PROCEDURE — 99213 OFFICE O/P EST LOW 20 MIN: CPT | Performed by: NURSE PRACTITIONER

## 2023-01-04 RX ORDER — METHYLPREDNISOLONE 4 MG/1
TABLET ORAL
Qty: 1 EACH | Refills: 0 | Status: SHIPPED | OUTPATIENT
Start: 2023-01-04

## 2023-01-04 RX ORDER — BACLOFEN 5 MG/1
5 TABLET ORAL 2 TIMES DAILY PRN
Qty: 30 TABLET | Refills: 0 | Status: SHIPPED | OUTPATIENT
Start: 2023-01-04

## 2023-01-04 NOTE — PROGRESS NOTES
Subjective     Janel Stack is a 62 y.o.. female.     Pt with problem list that includes chronic lumbar back pain. Pt with lumbar spine xray on 22 which showed thoracolumbar transitional segment with rudimentary rib  formation. There are 5 true nonrib-bearing lumbar segments. Paravertebral soft tissues have a satisfactory appearance. There is multilevel disc degeneration, most pronounced at L5-S1. Mid and lower lumbar facet hypertrophy. No compression deformity, spondylolysis or spondylolisthesis. Degenerative change.     Sciatica  This is a new problem. Episode onset: 2 months. The problem has been unchanged. Associated symptoms include arthralgias and numbness (right worse than left). Pertinent negatives include no fever.       The following portions of the patient's history were reviewed and updated as appropriate: allergies, current medications, past family history, past medical history, past social history, past surgical history and problem list.    Past Medical History:   Diagnosis Date   • Alcoholic ([facility]) 2021   • Cigarette nicotine dependence with nicotine-induced disorder 2021   • Mood disorder ([facility]) 2021   • Osteopenia 2021       Past Surgical History:   Procedure Laterality Date   •  SECTION             Review of Systems   Constitutional: Negative for fever.   Musculoskeletal: Positive for arthralgias, back pain and gait problem.   Neurological: Positive for numbness (right worse than left).       No Known Allergies    Objective     Vitals:    23 1556   BP: 112/62   Pulse: 70   Resp: 18   Temp: 97.8 °F (36.6 °C)   TempSrc: Temporal   SpO2: 96%   Weight: 77.1 kg (170 lb)   Height: 172.7 cm (67.99\")     Body mass index is 25.85 kg/m².    Physical Exam  Vitals reviewed.   HENT:      Head: Normocephalic.   Eyes:      Pupils: Pupils are equal, round, and reactive to light.   Cardiovascular:      Rate and Rhythm: Normal rate and regular rhythm.   Pulmonary:       Effort: Pulmonary effort is normal.      Breath sounds: Normal breath sounds.   Musculoskeletal:      Lumbar back: Tenderness present. No swelling, edema, deformity or bony tenderness. Decreased range of motion. Negative right straight leg raise test and negative left straight leg raise test.   Skin:     General: Skin is warm and dry.   Neurological:      Mental Status: She is alert and oriented to person, place, and time.   Psychiatric:         Behavior: Behavior normal.           Current Outpatient Medications:   •  Vitamin D, Cholecalciferol, (CHOLECALCIFEROL) 10 MCG (400 UNIT) tablet, Take 400 Units by mouth Daily., Disp: , Rfl:   •  atorvastatin (LIPITOR) 10 MG tablet, Take 1 tablet by mouth Daily., Disp: 90 tablet, Rfl: 1  •  Baclofen 5 MG tablet, Take 5 mg by mouth 2 (Two) Times a Day As Needed (low back pain)., Disp: 30 tablet, Rfl: 0  •  methylPREDNISolone (MEDROL) 4 MG dose pack, Take as directed on package instructions., Disp: 1 each, Rfl: 0      XR Spine Lumbar 2 or 3 View  XR SPINE LUMBAR 2 OR 3 VIEW-     CLINICAL: Low back pain.     FINDINGS: Thoracolumbar transitional segment with rudimentary rib  formation. There are 5 true nonrib-bearing lumbar segments.  Paravertebral soft tissues have a satisfactory appearance. There is  multilevel disc degeneration, most pronounced at L5-S1. Mid and lower  lumbar facet hypertrophy. No compression deformity, spondylolysis or  spondylolisthesis.     CONCLUSION: Degenerative change as described above.     This report was finalized on 8/23/2022 10:19 AM by Dr. Homar Campbell M.D.           Diagnoses and all orders for this visit:    1. Lumbar radiculopathy (Primary)  -     MRI Lumbar Spine Without Contrast; Future  -     Ambulatory Referral to Physical Therapy Evaluate and treat    2. Lumbar back pain  Comments:  chronic  Orders:  -     MRI Lumbar Spine Without Contrast; Future  -     Ambulatory Referral to Physical Therapy Evaluate and treat  -      methylPREDNISolone (MEDROL) 4 MG dose pack; Take as directed on package instructions.  Dispense: 1 each; Refill: 0  -     Baclofen 5 MG tablet; Take 5 mg by mouth 2 (Two) Times a Day As Needed (low back pain).  Dispense: 30 tablet; Refill: 0        Patient Instructions   May use cold compress/ice pack 10-15 minutes at a time several times a day   May use warm compress/heating pad 10-15 minutes at at time several times a day  May use over the counter biofreeze as needed (wash off from skin before using heating pad)        Return if symptoms worsen or fail to improve.

## 2023-01-05 NOTE — PATIENT INSTRUCTIONS
May use cold compress/ice pack 10-15 minutes at a time several times a day   May use warm compress/heating pad 10-15 minutes at at time several times a day  May use over the counter biofreeze as needed (wash off from skin before using heating pad)

## 2023-01-26 ENCOUNTER — TELEPHONE (OUTPATIENT)
Dept: FAMILY MEDICINE CLINIC | Facility: CLINIC | Age: 63
End: 2023-01-26

## 2023-01-26 NOTE — TELEPHONE ENCOUNTER
Caller: Janel Stack    Relationship: Self    Best call back number: [phone]    What orders are you requesting (i.e. lab or imaging): SAVED BY THE SCAN FOR PREVIOUS SMOKERS TO SCAN LUNGS    Where will you receive your lab/imaging services: NEAREST LOCATION TO [place]    Additional notes: PATIENT WOULD LIKE IMAGING SINCE SHE WAS A PREVIOUS SMOKER TO RULE OUT CANCER

## 2023-01-27 DIAGNOSIS — F17.219 CIGARETTE NICOTINE DEPENDENCE WITH NICOTINE-INDUCED DISORDER: ICD-10-CM

## 2023-01-27 DIAGNOSIS — Z12.2 SCREENING FOR LUNG CANCER: Primary | ICD-10-CM

## 2023-01-27 NOTE — TELEPHONE ENCOUNTER
Please call pt and let her know that I put in the order; someone from scheduling should call her in the next week. Thanks   LEFT MESSAGE WITH INFO

## 2023-01-27 NOTE — TELEPHONE ENCOUNTER
Caller: Janel Stack     Relationship: Self     Best call back number: [phone]     What orders are you requesting (i.e. lab or imaging): SAVED BY THE SCAN FOR PREVIOUS SMOKERS TO SCAN LUNGS     Where will you receive your lab/imaging services: NEAREST LOCATION TO [place]     Additional notes: PATIENT WOULD LIKE IMAGING SINCE SHE WAS A PREVIOUS SMOKER TO RULE OUT CANCER         Note

## 2023-02-10 ENCOUNTER — HOSPITAL ENCOUNTER (OUTPATIENT)
Dept: PHYSICAL THERAPY | Facility: HOSPITAL | Age: 63
Setting detail: THERAPIES SERIES
Discharge: HOME | End: 2023-02-10
Payer: COMMERCIAL

## 2023-02-10 DIAGNOSIS — M79.18 PIRIFORMIS MUSCLE PAIN: ICD-10-CM

## 2023-02-10 DIAGNOSIS — R26.2 DIFFICULTY WALKING: ICD-10-CM

## 2023-02-10 DIAGNOSIS — M54.50 CHRONIC BILATERAL LOW BACK PAIN WITHOUT SCIATICA: Primary | ICD-10-CM

## 2023-02-10 DIAGNOSIS — G89.29 CHRONIC BILATERAL LOW BACK PAIN WITHOUT SCIATICA: Primary | ICD-10-CM

## 2023-02-10 PROCEDURE — 97530 THERAPEUTIC ACTIVITIES: CPT

## 2023-02-10 PROCEDURE — 97161 PT EVAL LOW COMPLEX 20 MIN: CPT

## 2023-02-10 NOTE — THERAPY EVALUATION
Outpatient Physical Therapy Ortho Initial Evaluation  [facility]     Patient Name: Janel Stack  : 1960  MRN: [id number]  Today's Date: 2/10/2023      Visit Date: 02/10/2023    Patient Active Problem List   Diagnosis   • Alcoholic ([facility])   • Mood disorder ([facility])   • Cigarette nicotine dependence with nicotine-induced disorder   • Osteopenia   • Mixed hyperlipidemia   • Vitamin D deficiency   • Anxiety syndrome   • Chronic bilateral low back pain with bilateral sciatica        Past Medical History:   Diagnosis Date   • Alcoholic ([facility]) 2021   • Cigarette nicotine dependence with nicotine-induced disorder 2021   • Mood disorder ([facility]) 2021   • Osteopenia 2021        Past Surgical History:   Procedure Laterality Date   •  SECTION             Visit Dx:     ICD-10-CM ICD-9-CM   1. Chronic bilateral low back pain without sciatica  M54.50 724.2    G89.29 338.29   2. Difficulty walking  R26.2 719.7   3. Piriformis muscle pain  M79.18 729.1          Patient History     Row Name 02/10/23 1500             History    Chief Complaint Pain  -RS      Type of Pain Back pain  -RS      Date Current Problem(s) Began 12/10/22  approximate date  -RS      Brief Description of Current Complaint The pt is a 61 yo female who presents with chronic low back pain. She has a history of acute episodes of pain which limit her ability to return to standing from bending forward. Over the summer,  Her back locked up in her and limited her ability to work. She went to the chiropractor  Which helped over time, she was unable to sit due to pain at that time. Once the pain improved, she went back to her normal activity. However fairly recently, pain has begun down the back of her leg, initially down her RLE and the past 2 weeks into the left LE.  She is having difficulty with sleeping and pain worse in the morning until she gets moving. She is a  and works at [employer]. She has  to stand for long periods and lift which are limited/ feels weak. Her LE pain is made worse with bending backward. She enjoys playing tennis, she has pain with tennis and feels stiffer. Denies changes in B/B and denies N/T. She had an MRI ordered but insurance said she has to do PT first.  -RS         Pain     Pain Location Back  -RS      Pain at Present 5;6  -RS         Fall Risk Assessment    Any falls in the past year: No  -RS         Services    Are you currently receiving Home Health services No  -RS         Daily Activities    Primary Language English  -RS      How does patient learn best? Demonstration  -RS      Pt Participated in POC and Goals Yes  -RS         Safety    Are you being hurt, hit, or frightened by anyone at home or in your life? No  -RS      Are you being neglected by a caregiver No  -RS            User Key  (r) = Recorded By, (t) = Taken By, (c) = Cosigned By    Initials Name Provider Type    RS Nila Ladd PT Physical Therapist                 PT Ortho     Row Name 02/10/23 1500       DTR- Lower Quarter Clearing    Patellar tendon (L2-4) Right:;1- Minimal response;Left:;2- Normal response  -RS       Neural Tension Signs- Lower Quarter Clearing    Slump Bilateral:;Negative  -RS    SLR Bilateral:;Negative  -RS       Sensory Screen for Light Touch- Lower Quarter Clearing    L1 (inguinal area) Bilateral:;Intact  -RS    L2 (anterior mid thigh) Bilateral:;Intact  -RS    L3 (distal anterior thigh) Bilateral:;Intact  -RS    L4 (medial lower leg/foot) Bilateral:;Intact  -RS    L5 (lateral lower leg/great toe) Bilateral:;Intact  -RS    S1 (bottom of foot) Bilateral:;Intact  -RS       Myotomal Screen- Lower Quarter Clearing    Hip flexion (L2) Right:;4 (Good);Left:;4+ (Good +)  -RS    Knee extension (L3) Bilateral:;5 (Normal)  -RS    Ankle DF (L4) Right:;4+ (Good +);Left:;5 (Normal)  -RS    Great toe extension (L5) Bilateral:;4+ (Good +)  -RS    Ankle PF (S1) Bilateral:;4 (Good)  -RS    Knee  flexion (S2) Right:;4 (Good);Left:;4+ (Good +)  -RS       Lumbar ROM Screen- Lower Quarter Clearing    Lumbar Flexion Impaired  50% WNL  -RS    Lumbar Extension Impaired  stiff and some pain R, 50% WNL  -RS    Lumbar Lateral Flexion Impaired  -RS       SI/Hip Screen- Lower Quarter Clearing    Kyle's/Gian's test Bilateral:;Negative  -RS       Special Tests/Palpation    Special Tests/Palpation Lumbar/SI  -RS       Lumbosacral Palpation    Lumbosacral Palpation? Yes  -RS    Piriformis Right:;Tender;Guarded/taut  -RS       MMT (Manual Muscle Testing)    Rt Lower Ext Rt Hip Extension;Rt Hip ABduction;Rt Hip Internal (Medial) Rotation;Rt Hip External (Lateral) Rotation  -RS    Lt Lower Ext Lt Hip Extension;Lt Hip ABduction;Lt Hip Internal (Medial) Rotation;Lt Hip External (Lateral) Rotation  -RS       MMT Right Lower Ext    Rt Hip Extension MMT, Gross Movement (4/5) good  -RS    Rt Hip ABduction MMT, Gross Movement (4/5) good  -RS    Rt Hip Internal (Medial) Rotation MMT, Gross Movement (4-/5) good minus  -RS    Rt Hip External (Lateral) Rotation MMT, Gross Movement (4/5) good  -RS    Rt Lower Extremity Comments  ER pain  -RS       MMT Left Lower Ext    Lt Hip Extension MMT, Gross Movement (4+/5) good plus  -RS    Lt Hip ABduction MMT, Gross Movement (4+/5) good plus  -RS    Lt Hip Internal (Medial) Rotation MMT, Gross Movement (4/5) good  -RS    Lt Hip External (Lateral) Rotation MMT, Gross Movement (4+/5) good plus  -RS       Flexibility    Flexibility Tested? Lower Extremity  -RS       Lower Extremity Flexibility    Hamstrings Bilateral:;Moderately limited  -RS    Hip External Rotators Right:;Moderately limited;Left:;Mildly limited  -RS          User Key  (r) = Recorded By, (t) = Taken By, (c) = Cosigned By    Initials Name Provider Type    RS Nila Ladd PT Physical Therapist                            Therapy Education  Education Details: Role of outpatient PT, POC, differential diagnosis, initial HEP,  expectations, goals, anatomy. Access Code [email or web address]  Given: HEP, Symptoms/condition management  Program: New  How Provided: Verbal, Demonstration, Written  Provided to: Patient  Level of Understanding: Verbalized, Demonstrated      PT OP Goals     Row Name 02/10/23 1500          PT Short Term Goals    STG Date to Achieve 03/27/23  -RS     STG 1 The pt will wake no more than 1x per night with pain to facilitate improved restorative sleep pattern.  -RS     STG 1 Progress New  -RS     STG 2 The pt will report pain at end of work day to no more than 5/10 (compared to 8/10) to indicate improved functional activity tolerance.  -RS     STG 2 Progress New  -RS        Long Term Goals    LTG Date to Achieve 04/26/23  -RS     LTG 1 The pt will participate in tennis 3x per week without pain greater than 3/10 and with at least 60% improved confidence to facilitate improved recreatinal activity performance.  -RS     LTG 1 Progress New  -RS     LTG 2 The pt will demonstrate IND and compliant with HEP focused on IND condition management and return to PLOF.  -RS     LTG 2 Progress New  -RS     LTG 3 The pt will score no more than 20% disability on the JANAY to indicate improved perceived performance of ADLs.  -RS     LTG 3 Progress New  -RS     LTG 4 The pt will report at least 70% reduction in RLE referred pain to facilitate improved QOL.  -RS     LTG 4 Progress New  -RS        Time Calculation    PT Goal Re-Cert Due Date 05/11/23  -RS           User Key  (r) = Recorded By, (t) = Taken By, (c) = Cosigned By    Initials Name Provider Type    RS Nila Ladd, PT Physical Therapist                 PT Assessment/Plan     Row Name 02/10/23 1500          PT Assessment    Functional Limitations Performance in work activities;Performance in self-care ADL;Performance in leisure activities;Limitations in functional capacity and performance;Limitations in community activities;Performance in sport activities  -RS     Impairments  Balance;Endurance;Gait;Sensation;Range of motion;Posture;Pain;Muscle strength  -RS     Assessment Comments Janel Stack is a 62 y.o. female referred to physical therapy for  LBP and RLE pain. She presents with a stable clinical presentation, along with no remarkable comorbidities and personal factors of chronicity of condition that may impact her progress in the plan of care. Pt presents today with decreased R ER and AB strength with increased pain, tenderness to palpation R piriformis, decreased lumbar AROM . her signs and symptoms are consistent with referring diagnosis. The previous impairments limit her ability to participate in recreational activity performance (tennis), tolerate work performance (prolonged standing), sleep without waking in pain. The pt self scores 32% disability on the JANAY (100=full disability).Pt will benefit from skilled PT to address the previous impairments and return to PLOF.  -RS     Please refer to paper survey for additional self-reported information No  -RS     Rehab Potential Good  -RS     Patient/caregiver participated in establishment of treatment plan and goals Yes  -RS     Patient would benefit from skilled therapy intervention Yes  -RS        PT Plan    PT Frequency 2x/week  -RS     Predicted Duration of Therapy Intervention (PT) 14 sessions  -RS     Planned CPT's? PT EVAL LOW COMPLEXITY: 67705;PT RE-EVAL: 28390;PT THER PROC EA 15 MIN: 87010;PT THER ACT EA 15 MIN: 66887;PT MANUAL THERAPY EA 15 MIN: 89745;PT NEUROMUSC RE-EDUCATION EA 15 MIN: 16313;PT GAIT TRAINING EA 15 MIN: 36756;PT SELF CARE/HOME MGMT/TRAIN EA 15: 11773;PT HOT OR COLD PACK TREAT MCARE;PT ELECTRICAL STIM UNATTEND:   -RS     PT Plan Comments warm up on bike/nustep, LTR, TA brace, HS stretch, bridge. Consider STM R piriformis vs DDN  -RS           User Key  (r) = Recorded By, (t) = Taken By, (c) = Cosigned By    Initials Name Provider Type    RS Nila Ladd, PT Physical Therapist                    OP Exercises     Row Name 02/10/23 1500             Total Minutes    19247 - PT Therapeutic Activity Minutes 10  -RS      69629 - PT Manual Therapy Minutes 5  -RS         Exercise 1    Exercise Name 1 nustep/rec bike  -RS      Additional Comments next time  -RS         Exercise 2    Exercise Name 2 piriformis stretch  -RS      Cueing 2 Verbal;Demo  -RS      Reps 2 3  -RS      Time 2 20s  -RS         Exercise 3    Exercise Name 3 sl clamshell  -RS      Cueing 3 Verbal;Demo  -RS      Reps 3 15  -RS         Exercise 4    Exercise Name 4 sciatic nerve glide  -RS      Cueing 4 Verbal  -RS      Reps 4 15  -RS      Additional Comments seated- head movement  -RS            User Key  (r) = Recorded By, (t) = Taken By, (c) = Cosigned By    Initials Name Provider Type    RS Nila Ladd PT Physical Therapist              Manual Rx (last 36 hours)     Manual Treatments     Row Name 02/10/23 1500             Total Minutes    01473 - PT Manual Therapy Minutes 5  -RS         Manual Rx 1    Manual Rx 1 Location STM R piri  -RS      Manual Rx 1 Duration 5 min  -RS            User Key  (r) = Recorded By, (t) = Taken By, (c) = Cosigned By    Initials Name Provider Type    RS Nila Ladd, PT Physical Therapist                            Outcome Measure Options: Modified Oswestry  Modified Oswestry  Modified Oswestry Score/Comments: 32% disability      Time Calculation:     Start Time: 1501  Stop Time: 1544  Time Calculation (min): 43 min  Timed Charges  41777 - PT Manual Therapy Minutes: 5  79210 - PT Therapeutic Activity Minutes: 10  Untimed Charges  PT Eval/Re-eval Minutes: 25  Total Minutes  Timed Charges Total Minutes: 15  Untimed Charges Total Minutes: 25   Total Minutes: 25     Therapy Charges for Today     Code Description Service Date Service Provider Modifiers Qty    32059867630 HC PT THERAPEUTIC ACT EA 15 MIN 2/10/2023 Nila Ladd, PT GP 1    05583979358 HC PT EVAL LOW COMPLEXITY 2 2/10/2023 Julee  Nila, PT GP 1          PT G-Codes  Outcome Measure Options: Modified Oswestry  Modified Oswestry Score/Comments: 32% disability         Nila Ladd, PT  2/10/2023

## 2023-02-16 ENCOUNTER — APPOINTMENT (OUTPATIENT)
Dept: PHYSICAL THERAPY | Facility: HOSPITAL | Age: 63
End: 2023-02-16
Payer: COMMERCIAL

## 2023-03-14 ENCOUNTER — APPOINTMENT (OUTPATIENT)
Dept: PHYSICAL THERAPY | Facility: HOSPITAL | Age: 63
End: 2023-03-14
Payer: COMMERCIAL

## 2023-03-21 ENCOUNTER — APPOINTMENT (OUTPATIENT)
Dept: PHYSICAL THERAPY | Facility: HOSPITAL | Age: 63
End: 2023-03-21
Payer: COMMERCIAL

## 2023-03-23 ENCOUNTER — APPOINTMENT (OUTPATIENT)
Dept: PHYSICAL THERAPY | Facility: HOSPITAL | Age: 63
End: 2023-03-23
Payer: COMMERCIAL

## 2023-03-24 ENCOUNTER — APPOINTMENT (OUTPATIENT)
Dept: PHYSICAL THERAPY | Facility: HOSPITAL | Age: 63
End: 2023-03-24
Payer: COMMERCIAL

## 2023-03-28 ENCOUNTER — HOSPITAL ENCOUNTER (OUTPATIENT)
Dept: PHYSICAL THERAPY | Facility: HOSPITAL | Age: 63
Setting detail: THERAPIES SERIES
Discharge: HOME | End: 2023-03-28
Payer: COMMERCIAL

## 2023-03-28 DIAGNOSIS — R26.2 DIFFICULTY WALKING: ICD-10-CM

## 2023-03-28 DIAGNOSIS — M79.18 PIRIFORMIS MUSCLE PAIN: ICD-10-CM

## 2023-03-28 DIAGNOSIS — M54.50 CHRONIC BILATERAL LOW BACK PAIN WITHOUT SCIATICA: Primary | ICD-10-CM

## 2023-03-28 DIAGNOSIS — G89.29 CHRONIC BILATERAL LOW BACK PAIN WITHOUT SCIATICA: Primary | ICD-10-CM

## 2023-03-28 PROCEDURE — 97140 MANUAL THERAPY 1/> REGIONS: CPT

## 2023-03-28 PROCEDURE — 97110 THERAPEUTIC EXERCISES: CPT

## 2023-03-28 NOTE — THERAPY PROGRESS REPORT/RE-CERT
Outpatient Physical Therapy Ortho Progress Note  [facility]     Patient Name: Janel Stack  : 1960  MRN: [id number]  Today's Date: 3/28/2023      Visit Date: 2023    Visit Dx:    ICD-10-CM ICD-9-CM   1. Chronic bilateral low back pain without sciatica  M54.50 724.2    G89.29 338.29   2. Difficulty walking  R26.2 719.7   3. Piriformis muscle pain  M79.18 729.1       Patient Active Problem List   Diagnosis   • Alcoholic (HCC)   • Mood disorder (HCC)   • Cigarette nicotine dependence with nicotine-induced disorder   • Osteopenia   • Mixed hyperlipidemia   • Vitamin D deficiency   • Anxiety syndrome   • Chronic bilateral low back pain with bilateral sciatica        Past Medical History:   Diagnosis Date   • Alcoholic (HCC) 2021   • Cigarette nicotine dependence with nicotine-induced disorder 2021   • Mood disorder (HCC) 2021   • Osteopenia 2021        Past Surgical History:   Procedure Laterality Date   •  SECTION              PT Ortho     Row Name 23 0900       Lumbosacral Palpation    Piriformis Right:;Tender  -RS    Erector Spinae (Paraspinals) Right:;Tender  -RS       MMT Right Lower Ext    Rt Hip Extension MMT, Gross Movement (4/5) good  -RS    Rt Hip ABduction MMT, Gross Movement (4/5) good  -RS    Rt Hip Internal (Medial) Rotation MMT, Gross Movement (4-/5) good minus  -RS    Rt Hip External (Lateral) Rotation MMT, Gross Movement (4/5) good  -RS          User Key  (r) = Recorded By, (t) = Taken By, (c) = Cosigned By    Initials Name Provider Type    RS Nila Ladd, PT Physical Therapist                             PT Assessment/Plan     Row Name 23 0900          PT Assessment    Functional Limitations Performance in work activities;Performance in self-care ADL;Performance in leisure activities;Limitations in functional capacity and performance;Limitations in community activities;Performance in sport activities  -RS     Impairments  Balance;Endurance;Gait;Sensation;Range of motion;Posture;Pain;Muscle strength  -RS     Assessment Comments The pt returns for first follow up session after initial eval reporting improvement in neural symptoms but continued low back pain. She reports poor to fair compliance with HEP.she has met 1/2 STG and 1/4 LTG. She has not hd neural symptoms in 3 weeks. She continues to have increased pain at the end of a full work day rated 8/10. She has returned to tennis however has some discomfort. Initiated manual therapy focused on R lumbar opening and STM R lumbar paraspinals with good tolerance. Reinforced importance of compliance with HEP and progressed therex to include sidelying thoracic rotation,cat camel, clamshell with resistance, bridging all with good tolerance. The pt remains appropriate for skilled PT to address limitations in pain, strength, ad functional activity tolerance.  -RS        PT Plan    PT Plan Comments Assess response to manual therapy, progress lumbopelvic stability as tolerance allows, consider side steps, AR press, ball roll out  -RS           User Key  (r) = Recorded By, (t) = Taken By, (c) = Cosigned By    Initials Name Provider Type    RS Nila Ladd, PT Physical Therapist                   OP Exercises     Row Name 03/28/23 0900             Subjective Comments    Subjective Comments Pt reports no nerve symptoms for 3 weeks, has only performed HEP a couple of times  -RS         Total Minutes    87320 - PT Therapeutic Exercise Minutes 30  -RS      07629 - PT Manual Therapy Minutes 9  -RS         Exercise 1    Exercise Name 1 nustep/rec bike  -RS      Time 1 5 min  -RS         Exercise 2    Exercise Name 2 piriformis stretch  -RS      Cueing 2 Verbal;Demo  -RS      Reps 2 3  -RS      Time 2 20s  -RS         Exercise 3    Exercise Name 3 sl clamshell  -RS      Cueing 3 Verbal;Demo  -RS      Reps 3 15  -RS         Exercise 4    Exercise Name 4 sciatic nerve glide  -RS      Cueing 4 Verbal   -RS      Sets 4 2  -RS      Reps 4 10  -RS      Time 4 RTB  -RS         Exercise 5    Exercise Name 5 open book top leg bent  -RS      Cueing 5 Verbal;Demo  -RS      Sets 5 10ea  -RS         Exercise 6    Exercise Name 6 bridge with TA  -RS      Cueing 6 Verbal;Demo  -RS      Sets 6 2  -RS      Reps 6 10  -RS      Time 6 5s  -RS         Exercise 7    Exercise Name 7 cat camel  -RS      Cueing 7 Verbal;Demo  -RS      Reps 7 15  -RS            User Key  (r) = Recorded By, (t) = Taken By, (c) = Cosigned By    Initials Name Provider Type    RS Nila Ladd, PT Physical Therapist                         Manual Rx (last 36 hours)     Manual Treatments     Row Name 03/28/23 0900             Total Minutes    67327 - PT Manual Therapy Minutes 9  -RS         Manual Rx 1    Manual Rx 1 Location STM R lumbar ES  -RS         Manual Rx 2    Manual Rx 2 Location T/L gapping- rotation anad sidebending  -RS            User Key  (r) = Recorded By, (t) = Taken By, (c) = Cosigned By    Initials Name Provider Type    RS Nila Ladd, PT Physical Therapist                 PT OP Goals     Row Name 03/28/23 0900          PT Short Term Goals    STG Date to Achieve 03/27/23  -RS     STG 1 The pt will wake no more than 1x per night with pain to facilitate improved restorative sleep pattern.  -RS     STG 1 Progress Met  -RS     STG 1 Progress Comments waking her 1x per night  -RS     STG 2 The pt will report pain at end of work day to no more than 5/10 (compared to 8/10) to indicate improved functional activity tolerance.  -RS     STG 2 Progress Ongoing;Progressing  -RS     STG 2 Progress Comments pain rated 8/10- no change  -RS        Long Term Goals    LTG Date to Achieve 04/26/23  -RS     LTG 1 The pt will participate in tennis 3x per week without pain greater than 3/10 and with at least 60% improved confidence to facilitate improved recreatinal activity performance.  -RS     LTG 1 Progress Ongoing  -RS     LTG 1 Progress Comments  has returned to tennis 4 times since eval, sore after and some pain during but does not increase  -RS     LTG 2 The pt will demonstrate IND and compliant with HEP focused on IND condition management and return to PLOF.  -RS     LTG 2 Progress Ongoing  -RS     LTG 2 Progress Comments has performed a couple of times  -RS     LTG 3 The pt will score no more than 20% disability on the JANAY to indicate improved perceived performance of ADLs.  -RS     LTG 3 Progress Ongoing  -RS     LTG 4 The pt will report at least 70% reduction in RLE referred pain to facilitate improved QOL.  -RS     LTG 4 Progress Met  -RS     LTG 4 Progress Comments has not had any nerve pain in the past 3 weeks  -RS           User Key  (r) = Recorded By, (t) = Taken By, (c) = Cosigned By    Initials Name Provider Type    RS Nila Ladd PT Physical Therapist                Therapy Education  Given: HEP  Program: Reinforced  How Provided: Verbal, Demonstration  Provided to: Patient  Level of Understanding: Verbalized, Demonstrated              Time Calculation:   Start Time: 0921  Stop Time: 1001  Time Calculation (min): 40 min  Timed Charges  40934 - PT Therapeutic Exercise Minutes: 30  40726 - PT Manual Therapy Minutes: 9  Total Minutes  Timed Charges Total Minutes: 39   Total Minutes: 39  Therapy Charges for Today     Code Description Service Date Service Provider Modifiers Qty    71683190071  PT THER PROC EA 15 MIN 3/28/2023 Nila Ladd PT GP 2    [id number]  PT MANUAL THERAPY EA 15 MIN 3/28/2023 Nila Ladd, PT GP 1                    Nila Ladd PT  3/28/2023

## 2023-03-30 ENCOUNTER — APPOINTMENT (OUTPATIENT)
Dept: PHYSICAL THERAPY | Facility: HOSPITAL | Age: 63
End: 2023-03-30
Payer: COMMERCIAL

## 2023-12-09 NOTE — TELEPHONE ENCOUNTER
Caller: Janel Stack    Relationship: Self    Best call back number: [phone]    What medication are you requesting: INDOMETHACIN    What are your current symptoms: BACK PAIN    How long have you been experiencing symptoms: A LITTLE OVER 3 WEEKS    Have you had these symptoms before:    [] Yes  [x] No    Have you been treated for these symptoms before:   [] Yes  [x] No    If a prescription is needed, what is your preferred pharmacy and phone number: [id number] [address] [facility] AT [facility] [address] - [phone] [facility] [phone]      Additional notes: PATIENT STATES SHE HAS SEEN THE CHIROPRACTOR OVER THE LAST 3 WEEKS BUT HAS HAD LITTLE RELIEF.    PLEASE CALL AND ADVISE           declines

## 2024-02-05 DIAGNOSIS — Z13.29 SCREENING FOR THYROID DISORDER: ICD-10-CM

## 2024-02-05 DIAGNOSIS — E55.9 VITAMIN D DEFICIENCY: ICD-10-CM

## 2024-02-05 DIAGNOSIS — Z00.00 ANNUAL PHYSICAL EXAM: Primary | ICD-10-CM

## 2024-02-10 LAB
25(OH)D3+25(OH)D2 SERPL-MCNC: 29 NG/ML (ref 30–100)
ALBUMIN SERPL-MCNC: 4.6 G/DL (ref 3.5–5.2)
ALBUMIN/GLOB SERPL: 1.8 G/DL
ALP SERPL-CCNC: 88 U/L (ref 39–117)
ALT SERPL-CCNC: 27 U/L (ref 1–33)
APPEARANCE UR: CLEAR
AST SERPL-CCNC: 20 U/L (ref 1–32)
BACTERIA #/AREA URNS HPF: ABNORMAL /HPF
BASOPHILS # BLD AUTO: 0.09 10*3/MM3 (ref 0–0.2)
BASOPHILS NFR BLD AUTO: 1 % (ref 0–1.5)
BILIRUB SERPL-MCNC: 0.6 MG/DL (ref 0–1.2)
BILIRUB UR QL STRIP: NEGATIVE
BUN SERPL-MCNC: 9 MG/DL (ref 8–23)
BUN/CREAT SERPL: 13.2 (ref 7–25)
CALCIUM SERPL-MCNC: 9.7 MG/DL (ref 8.6–10.5)
CASTS URNS MICRO: ABNORMAL
CHLORIDE SERPL-SCNC: 105 MMOL/L (ref 98–107)
CHOLEST SERPL-MCNC: 247 MG/DL (ref 0–200)
CO2 SERPL-SCNC: 27.5 MMOL/L (ref 22–29)
COLOR UR: YELLOW
CREAT SERPL-MCNC: 0.68 MG/DL (ref 0.57–1)
EGFRCR SERPLBLD CKD-EPI 2021: 97.4 ML/MIN/1.73
EOSINOPHIL # BLD AUTO: 0.29 10*3/MM3 (ref 0–0.4)
EOSINOPHIL NFR BLD AUTO: 3.3 % (ref 0.3–6.2)
EPI CELLS #/AREA URNS HPF: ABNORMAL /HPF
ERYTHROCYTE [DISTWIDTH] IN BLOOD BY AUTOMATED COUNT: 12.6 % (ref 12.3–15.4)
GLOBULIN SER CALC-MCNC: 2.5 GM/DL
GLUCOSE SERPL-MCNC: 96 MG/DL (ref 65–99)
GLUCOSE UR QL STRIP: NEGATIVE
HBA1C MFR BLD: 4.9 % (ref 4.8–5.6)
HCT VFR BLD AUTO: 43.8 % (ref 34–46.6)
HDLC SERPL-MCNC: 55 MG/DL (ref 40–60)
HGB BLD-MCNC: 14.9 G/DL (ref 12–15.9)
HGB UR QL STRIP: NEGATIVE
IMM GRANULOCYTES # BLD AUTO: 0.02 10*3/MM3 (ref 0–0.05)
IMM GRANULOCYTES NFR BLD AUTO: 0.2 % (ref 0–0.5)
KETONES UR QL STRIP: NEGATIVE
LDLC SERPL CALC-MCNC: 175 MG/DL (ref 0–100)
LDLC/HDLC SERPL: 3.14 {RATIO}
LEUKOCYTE ESTERASE UR QL STRIP: NEGATIVE
LYMPHOCYTES # BLD AUTO: 3.53 10*3/MM3 (ref 0.7–3.1)
LYMPHOCYTES NFR BLD AUTO: 40.7 % (ref 19.6–45.3)
MCH RBC QN AUTO: 32.8 PG (ref 26.6–33)
MCHC RBC AUTO-ENTMCNC: 34 G/DL (ref 31.5–35.7)
MCV RBC AUTO: 96.5 FL (ref 79–97)
MONOCYTES # BLD AUTO: 0.83 10*3/MM3 (ref 0.1–0.9)
MONOCYTES NFR BLD AUTO: 9.6 % (ref 5–12)
MUCOUS THREADS URNS QL MICRO: ABNORMAL /HPF
NEUTROPHILS # BLD AUTO: 3.91 10*3/MM3 (ref 1.7–7)
NEUTROPHILS NFR BLD AUTO: 45.2 % (ref 42.7–76)
NITRITE UR QL STRIP: NEGATIVE
NRBC BLD AUTO-RTO: 0 /100 WBC (ref 0–0.2)
PH UR STRIP: 6.5 [PH] (ref 5–8)
PLATELET # BLD AUTO: 346 10*3/MM3 (ref 140–450)
POTASSIUM SERPL-SCNC: 5 MMOL/L (ref 3.5–5.2)
PROT SERPL-MCNC: 7.1 G/DL (ref 6–8.5)
PROT UR QL STRIP: NEGATIVE
RBC # BLD AUTO: 4.54 10*6/MM3 (ref 3.77–5.28)
RBC #/AREA URNS HPF: ABNORMAL /HPF
SODIUM SERPL-SCNC: 144 MMOL/L (ref 136–145)
SP GR UR STRIP: 1.02 (ref 1–1.03)
TRIGL SERPL-MCNC: 96 MG/DL (ref 0–150)
TSH SERPL DL<=0.005 MIU/L-ACNC: 2.85 UIU/ML (ref 0.27–4.2)
UROBILINOGEN UR STRIP-MCNC: NORMAL MG/DL
VLDLC SERPL CALC-MCNC: 17 MG/DL (ref 5–40)
WBC # BLD AUTO: 8.67 10*3/MM3 (ref 3.4–10.8)
WBC #/AREA URNS HPF: ABNORMAL /HPF

## 2024-02-16 ENCOUNTER — OFFICE VISIT (OUTPATIENT)
Dept: FAMILY MEDICINE CLINIC | Facility: CLINIC | Age: 64
End: 2024-02-16
Payer: COMMERCIAL

## 2024-02-16 VITALS
RESPIRATION RATE: 16 BRPM | BODY MASS INDEX: 25.76 KG/M2 | WEIGHT: 170 LBS | OXYGEN SATURATION: 97 % | SYSTOLIC BLOOD PRESSURE: 122 MMHG | DIASTOLIC BLOOD PRESSURE: 76 MMHG | HEIGHT: 68 IN | HEART RATE: 71 BPM | TEMPERATURE: 98.1 F

## 2024-02-16 DIAGNOSIS — Z12.11 COLON CANCER SCREENING: ICD-10-CM

## 2024-02-16 DIAGNOSIS — F17.219 CIGARETTE NICOTINE DEPENDENCE WITH NICOTINE-INDUCED DISORDER: ICD-10-CM

## 2024-02-16 DIAGNOSIS — F10.90 ALCOHOL USE DISORDER: ICD-10-CM

## 2024-02-16 DIAGNOSIS — Z00.00 ANNUAL PHYSICAL EXAM: Primary | ICD-10-CM

## 2024-02-16 DIAGNOSIS — E78.2 MIXED HYPERLIPIDEMIA: ICD-10-CM

## 2024-02-16 RX ORDER — ATORVASTATIN CALCIUM 10 MG/1
10 TABLET, FILM COATED ORAL DAILY
Qty: 90 TABLET | Refills: 1 | Status: SHIPPED | OUTPATIENT
Start: 2024-02-16

## 2024-02-16 NOTE — PROGRESS NOTES
"Subjective   Janel Stack is a 63 y.o. female. Patient is here today for   Chief Complaint   Patient presents with    Annual Exam          Vitals:    02/16/24 1449   BP: 122/76   Pulse: 71   Resp: 16   Temp: 98.1 °F (36.7 °C)   SpO2: 97%   Weight: 77.1 kg (170 lb)   Height: 172.7 cm (67.99\")      Body mass index is 25.85 kg/m².    The following portions of the patient's history were reviewed and updated as appropriate: allergies, current medications, past family history, past medical history, past social history, past surgical history and problem list.    Past Medical History:   Diagnosis Date    Alcoholic 5/21/2021    Cigarette nicotine dependence with nicotine-induced disorder 5/21/2021    Mood disorder 5/21/2021    Osteopenia 5/21/2021      No Known Allergies   Social History     Socioeconomic History    Marital status:    Tobacco Use    Smoking status: Every Day     Packs/day: 1     Types: Cigarettes    Smokeless tobacco: Never    Tobacco comments:     off and on for 40+ years   Substance and Sexual Activity    Alcohol use: Yes     Alcohol/week: 5.0 standard drinks of alcohol     Types: 5 Glasses of wine per week     Comment: 3 A DAY- 15 PER WEEK - PT REPORTS HAS BEEN DRINKING A BOTTLE OF WINE EVERY NIGHT        Current Outpatient Medications:     atorvastatin (LIPITOR) 10 MG tablet, Take 1 tablet by mouth Daily., Disp: 90 tablet, Rfl: 1     Last Mammogram: 12/28/23 negative    Last dexa scan: 12/28/23 Osteopenia    Last pap: 11/2/23 negative    cologaurd due: last one done 1/18/21 which was negative.     ECG Report: SR, vent rate 70, GA int 136, QRS 80      Objective   History of Present Illness  Patient here today for annual physical.     Patient admitting she never took the atorvastatin script; Patient stating she does not like being on medications.     Janel Stack 63 y.o. female who presents for an Annual Wellness Visit.  she has a history of   Patient Active Problem List   Diagnosis    " Alcoholic    Mood disorder    Cigarette nicotine dependence with nicotine-induced disorder    Osteopenia    Mixed hyperlipidemia    Vitamin D deficiency    Anxiety syndrome    Chronic bilateral low back pain with bilateral sciatica    Alcohol use disorder   .  she has been doing well with new interval problems.  Labs results discussed in detail with the patient.  Plan to update vaccines if needed today.    Health Habits:  Dental Exam. up to date; 1 month  Eye Exam. up to date; 3-4 weeks ago  Exercise: 3 times/week.  Current exercise activities include: walking  Diet: eating healthy   Water: drinking some water, [place] mineral water and flavored water at times    Preventative counseling  Discussed face to face the importance of healthy diet and exercise.     PHQ-9 Depression Screening  Little interest or pleasure in doing things? 0-->not at all   Feeling down, depressed, or hopeless? 0-->not at all   Trouble falling or staying asleep, or sleeping too much?     Feeling tired or having little energy?     Poor appetite or overeating?     Feeling bad about yourself - or that you are a failure or have let yourself or your family down?     Trouble concentrating on things, such as reading the newspaper or watching television?     Moving or speaking so slowly that other people could have noticed? Or the opposite - being so fidgety or restless that you have been moving around a lot more than usual?     Thoughts that you would be better off dead, or of hurting yourself in some way?     PHQ-9 Total Score 0   If you checked off any problems, how difficult have these problems made it for you to do your work, take care of things at home, or get along with other people?          Recent Results (from the past 336 hour(s))   CBC & Differential    Collection Time: 02/09/24  8:55 AM    Specimen: Blood   Result Value Ref Range    WBC 8.67 3.40 - 10.80 10*3/mm3    RBC 4.54 3.77 - 5.28 10*6/mm3    Hemoglobin 14.9 12.0 - 15.9 g/dL     Hematocrit 43.8 34.0 - 46.6 %    MCV 96.5 79.0 - 97.0 fL    MCH 32.8 26.6 - 33.0 pg    MCHC 34.0 31.5 - 35.7 g/dL    RDW 12.6 12.3 - 15.4 %    Platelets 346 140 - 450 10*3/mm3    Neutrophil Rel % 45.2 42.7 - 76.0 %    Lymphocyte Rel % 40.7 19.6 - 45.3 %    Monocyte Rel % 9.6 5.0 - 12.0 %    Eosinophil Rel % 3.3 0.3 - 6.2 %    Basophil Rel % 1.0 0.0 - 1.5 %    Neutrophils Absolute 3.91 1.70 - 7.00 10*3/mm3    Lymphocytes Absolute 3.53 (H) 0.70 - 3.10 10*3/mm3    Monocytes Absolute 0.83 0.10 - 0.90 10*3/mm3    Eosinophils Absolute 0.29 0.00 - 0.40 10*3/mm3    Basophils Absolute 0.09 0.00 - 0.20 10*3/mm3    Immature Granulocyte Rel % 0.2 0.0 - 0.5 %    Immature Grans Absolute 0.02 0.00 - 0.05 10*3/mm3    nRBC 0.0 0.0 - 0.2 /100 WBC   Comprehensive Metabolic Panel    Collection Time: 02/09/24  8:55 AM    Specimen: Blood   Result Value Ref Range    Glucose 96 65 - 99 mg/dL    BUN 9 8 - 23 mg/dL    Creatinine 0.68 0.57 - 1.00 mg/dL    EGFR Result 97.4 >60.0 mL/min/1.73    BUN/Creatinine Ratio 13.2 7.0 - 25.0    Sodium 144 136 - 145 mmol/L    Potassium 5.0 3.5 - 5.2 mmol/L    Chloride 105 98 - 107 mmol/L    Total CO2 27.5 22.0 - 29.0 mmol/L    Calcium 9.7 8.6 - 10.5 mg/dL    Total Protein 7.1 6.0 - 8.5 g/dL    Albumin 4.6 3.5 - 5.2 g/dL    Globulin 2.5 gm/dL    A/G Ratio 1.8 g/dL    Total Bilirubin 0.6 0.0 - 1.2 mg/dL    Alkaline Phosphatase 88 39 - 117 U/L    AST (SGOT) 20 1 - 32 U/L    ALT (SGPT) 27 1 - 33 U/L   Lipid Panel With LDL / HDL Ratio    Collection Time: 02/09/24  8:55 AM    Specimen: Blood   Result Value Ref Range    Total Cholesterol 247 (H) 0 - 200 mg/dL    Triglycerides 96 0 - 150 mg/dL    HDL Cholesterol 55 40 - 60 mg/dL    VLDL Cholesterol Micheal 17 5 - 40 mg/dL    LDL Chol Calc (NIH) 175 (H) 0 - 100 mg/dL    LDL/HDL RATIO 3.14    TSH Rfx On Abnormal To Free T4    Collection Time: 02/09/24  8:55 AM    Specimen: Blood   Result Value Ref Range    TSH 2.850 0.270 - 4.200 uIU/mL   Hemoglobin A1c    Collection  Time: 02/09/24  8:55 AM    Specimen: Blood   Result Value Ref Range    Hemoglobin A1C 4.90 4.80 - 5.60 %   Urinalysis With Microscopic - Urine, Clean Catch    Collection Time: 02/09/24  8:55 AM    Specimen: Urine, Clean Catch   Result Value Ref Range    Specific Gravity, UA 1.020 1.005 - 1.030    pH, UA 6.5 5.0 - 8.0    Color, UA Yellow     Appearance, UA Clear Clear    Leukocytes, UA Negative Negative    Protein Negative Negative    Glucose, UA Negative Negative    Ketones Negative Negative    Blood, UA Negative Negative    Bilirubin, UA Negative Negative    Urobilinogen, UA Comment     Nitrite, UA Negative Negative   Vitamin D,25-Hydroxy    Collection Time: 02/09/24  8:55 AM    Specimen: Blood   Result Value Ref Range    25 Hydroxy, Vitamin D 29.0 (L) 30.0 - 100.0 ng/mL   Microscopic Examination -    Collection Time: 02/09/24  8:55 AM   Result Value Ref Range    WBC, UA 3-5 (A) /HPF    RBC, UA Comment /HPF    Epithelial Cells (non renal) 3-6 (A) /HPF    Cast Type Comment     Mucus, UA Trace /HPF    Bacteria, UA Trace (A) None Seen /HPF        Review of Systems   Constitutional: Negative.    HENT: Negative.     Respiratory: Negative.     Cardiovascular: Negative.    Gastrointestinal: Negative.    Genitourinary: Negative.    Musculoskeletal: Negative.    Neurological: Negative.    Psychiatric/Behavioral: Negative.         Physical Exam  Constitutional:       Appearance: Normal appearance. She is well-developed.   HENT:      Head: Normocephalic and atraumatic.      Right Ear: Tympanic membrane normal. Tympanic membrane is not erythematous.      Left Ear: Tympanic membrane normal. Tympanic membrane is not erythematous.      Nose: Nose normal.   Eyes:      Conjunctiva/sclera: Conjunctivae normal.      Pupils: Pupils are equal, round, and reactive to light.   Neck:      Thyroid: No thyromegaly.      Vascular: No carotid bruit.      Trachea: No tracheal deviation.   Cardiovascular:      Rate and Rhythm: Normal rate and  regular rhythm.      Pulses: Normal pulses.      Heart sounds: Normal heart sounds. No murmur heard.  Pulmonary:      Effort: No accessory muscle usage or respiratory distress.      Breath sounds: Normal breath sounds. No stridor. No decreased breath sounds, wheezing, rhonchi or rales.   Abdominal:      General: Bowel sounds are normal. There is no distension.      Palpations: Abdomen is soft. Abdomen is not rigid. There is no mass.      Tenderness: There is no abdominal tenderness. There is no guarding or rebound.      Hernia: No hernia is present.   Musculoskeletal:         General: Normal range of motion.      Cervical back: Normal range of motion and neck supple.   Lymphadenopathy:      Cervical: No cervical adenopathy.   Skin:     General: Skin is warm and dry.      Capillary Refill: Capillary refill takes less than 2 seconds.   Neurological:      Mental Status: She is alert and oriented to person, place, and time.      Cranial Nerves: No cranial nerve deficit.      Sensory: No sensory deficit.      Motor: No abnormal muscle tone.      Coordination: Coordination normal.   Psychiatric:         Speech: Speech normal.         Behavior: Behavior normal.         ASSESSMENT       Problems Addressed this Visit          Cardiac and Vasculature    Mixed hyperlipidemia    Relevant Medications    atorvastatin (LIPITOR) 10 MG tablet    Other Relevant Orders    Vascular Screening (Bundle) CAR    ECG 12 Lead (Completed)    Lipid Panel       Mental Health    Alcohol use disorder       Tobacco    Cigarette nicotine dependence with nicotine-induced disorder    Relevant Orders    CT Chest Low Dose Wo     Other Visit Diagnoses       Annual physical exam    -  Primary    Colon cancer screening        Relevant Orders    Cologuard - Stool, Per Rectum          Diagnoses         Codes Comments    Annual physical exam    -  Primary ICD-10-CM: Z00.00  ICD-9-CM: V70.0     Mixed hyperlipidemia     ICD-10-CM: E78.2  ICD-9-CM: 272.2      Alcohol use disorder     ICD-10-CM: F10.90  ICD-9-CM: V49.89     Cigarette nicotine dependence with nicotine-induced disorder     ICD-10-CM: F17.219  ICD-9-CM: 292.9     Colon cancer screening     ICD-10-CM: Z12.11  ICD-9-CM: V76.51               PLAN    Patient Instructions   Hyperlipidemia: starting atorvastatin 10 mg daily, recommend rto in 6 months for recheck. Recommend Patient to eat a heart healthy low fat low sugar diet, drink plenty of water with goal 64 oz a day, and exercise 3-5 times a week, for more than 30 minutes at a time.    Alcohol use disorder: Patient declined medication at this time. Recommend decreasing alcohol intake; recommend AA and other support therapies; recommend [email or web address] for therapist.     Nicotine dependence: recommend decreasing cigarettes per day; Patient declining medication at this time. ordering CT chest for further eval.    Follow up with dentist and optometrist when able  Always use seat belt when in vehicles    Return for fasting labs in 6 months, recheck in 6 months.

## 2024-02-20 NOTE — PATIENT INSTRUCTIONS
Hyperlipidemia: starting atorvastatin 10 mg daily, recommend rto in 6 months for recheck. Recommend Patient to eat a heart healthy low fat low sugar diet, drink plenty of water with goal 64 oz a day, and exercise 3-5 times a week, for more than 30 minutes at a time.    Alcohol use disorder: Patient declined medication at this time. Recommend decreasing alcohol intake; recommend AA and other support therapies; recommend [email or web address] for therapist.     Nicotine dependence: recommend decreasing cigarettes per day; Patient declining medication at this time. ordering CT chest for further eval.    Follow up with dentist and optometrist when able  Always use seat belt when in vehicles

## 2024-03-06 ENCOUNTER — HOSPITAL ENCOUNTER (OUTPATIENT)
Dept: CT IMAGING | Facility: HOSPITAL | Age: 64
Discharge: HOME | End: 2024-03-06
Admitting: NURSE PRACTITIONER
Payer: COMMERCIAL

## 2024-03-06 DIAGNOSIS — F17.219 CIGARETTE NICOTINE DEPENDENCE WITH NICOTINE-INDUCED DISORDER: ICD-10-CM

## 2024-03-06 PROCEDURE — 71271 CT THORAX LUNG CANCER SCR C-: CPT

## 2024-03-15 ENCOUNTER — TELEPHONE (OUTPATIENT)
Dept: FAMILY MEDICINE CLINIC | Facility: CLINIC | Age: 64
End: 2024-03-15
Payer: COMMERCIAL

## 2024-03-15 DIAGNOSIS — R19.5 POSITIVE COLORECTAL CANCER SCREENING USING COLOGUARD TEST: Primary | ICD-10-CM

## 2024-03-15 NOTE — TELEPHONE ENCOUNTER
Patient called and informed of results, informed of need for colonoscopy for further eval. Patient agreeing and referral placed.

## 2024-03-20 ENCOUNTER — TELEPHONE (OUTPATIENT)
Dept: FAMILY MEDICINE CLINIC | Facility: CLINIC | Age: 64
End: 2024-03-20

## 2024-03-20 NOTE — TELEPHONE ENCOUNTER
Caller: Janel Stack    Relationship: Self    Best call back number: [phone]  What test was performed: CT SCAN     When was the test performed: 3/6/24    Where was the test performed: HOSPITAL     Additional notes: PATIENT IS REQUESTING A CALL BACK WITH TEST RESULTS.

## 2024-03-21 ENCOUNTER — TELEPHONE (OUTPATIENT)
Dept: FAMILY MEDICINE CLINIC | Facility: CLINIC | Age: 64
End: 2024-03-21
Payer: COMMERCIAL

## 2024-03-21 NOTE — TELEPHONE ENCOUNTER
Called and left message for Patient; Patient to return call to office or will attempt to call Patient back at later time to go over CT results.

## 2024-03-27 ENCOUNTER — TELEPHONE (OUTPATIENT)
Dept: FAMILY MEDICINE CLINIC | Facility: CLINIC | Age: 64
End: 2024-03-27

## 2024-03-27 DIAGNOSIS — E78.2 MIXED HYPERLIPIDEMIA: Primary | ICD-10-CM

## 2024-03-27 NOTE — TELEPHONE ENCOUNTER
Caller: Janel Stack    Relationship: Self    Best call back number:    [phone] (Home)       What is the medical concern/diagnosis: FULL SKIN CHECK    What specialty or service is being requested: DERMATOLOGY     What is the provider, practice or medical service name: [facility]    What is the office location:      What is the office phone number: FAX NUMBER : [phone]    Any additional details: PLEASE FAX REFERRAL OVER.       THANKS

## 2024-04-05 ENCOUNTER — TELEPHONE (OUTPATIENT)
Dept: GASTROENTEROLOGY | Facility: CLINIC | Age: 64
End: 2024-04-05
Payer: COMMERCIAL

## 2024-04-11 DIAGNOSIS — Z12.11 ENCOUNTER FOR COLORECTAL CANCER SCREENING USING COLOGUARD TEST: Primary | ICD-10-CM

## 2024-04-11 DIAGNOSIS — R19.5 POSITIVE COLORECTAL CANCER SCREENING USING COLOGUARD TEST: ICD-10-CM

## 2024-04-11 DIAGNOSIS — Z12.12 ENCOUNTER FOR COLORECTAL CANCER SCREENING USING COLOGUARD TEST: Primary | ICD-10-CM

## 2024-04-24 ENCOUNTER — ANESTHESIA (OUTPATIENT)
Dept: SURGERY | Facility: SURGERY CENTER | Age: 64
End: 2024-04-24
Payer: COMMERCIAL

## 2024-04-24 ENCOUNTER — HOSPITAL ENCOUNTER (OUTPATIENT)
Facility: SURGERY CENTER | Age: 64
Setting detail: HOSPITAL OUTPATIENT SURGERY
Discharge: HOME | End: 2024-04-24
Attending: STUDENT IN AN ORGANIZED HEALTH CARE EDUCATION/TRAINING PROGRAM | Admitting: STUDENT IN AN ORGANIZED HEALTH CARE EDUCATION/TRAINING PROGRAM
Payer: COMMERCIAL

## 2024-04-24 ENCOUNTER — ANESTHESIA EVENT (OUTPATIENT)
Dept: SURGERY | Facility: SURGERY CENTER | Age: 64
End: 2024-04-24
Payer: COMMERCIAL

## 2024-04-24 VITALS
SYSTOLIC BLOOD PRESSURE: 124 MMHG | HEIGHT: 67 IN | BODY MASS INDEX: 26.74 KG/M2 | HEART RATE: 66 BPM | DIASTOLIC BLOOD PRESSURE: 74 MMHG | WEIGHT: 170.4 LBS | OXYGEN SATURATION: 98 % | TEMPERATURE: 97.8 F | RESPIRATION RATE: 16 BRPM

## 2024-04-24 DIAGNOSIS — R19.5 POSITIVE COLORECTAL CANCER SCREENING USING COLOGUARD TEST: ICD-10-CM

## 2024-04-24 PROCEDURE — 45385 COLONOSCOPY W/LESION REMOVAL: CPT | Performed by: STUDENT IN AN ORGANIZED HEALTH CARE EDUCATION/TRAINING PROGRAM

## 2024-04-24 PROCEDURE — 25010000002 LIDOCAINE 1 % SOLUTION: Performed by: NURSE ANESTHETIST, CERTIFIED REGISTERED

## 2024-04-24 PROCEDURE — 25010000002 PROPOFOL 1000 MG/100ML EMULSION: Performed by: NURSE ANESTHETIST, CERTIFIED REGISTERED

## 2024-04-24 PROCEDURE — 88342 IMHCHEM/IMCYTCHM 1ST ANTB: CPT | Performed by: STUDENT IN AN ORGANIZED HEALTH CARE EDUCATION/TRAINING PROGRAM

## 2024-04-24 PROCEDURE — 25010000002 PROPOFOL 10 MG/ML EMULSION: Performed by: NURSE ANESTHETIST, CERTIFIED REGISTERED

## 2024-04-24 PROCEDURE — 88341 IMHCHEM/IMCYTCHM EA ADD ANTB: CPT | Performed by: STUDENT IN AN ORGANIZED HEALTH CARE EDUCATION/TRAINING PROGRAM

## 2024-04-24 PROCEDURE — 88305 TISSUE EXAM BY PATHOLOGIST: CPT | Performed by: STUDENT IN AN ORGANIZED HEALTH CARE EDUCATION/TRAINING PROGRAM

## 2024-04-24 PROCEDURE — 25810000003 LACTATED RINGERS PER 1000 ML: Performed by: STUDENT IN AN ORGANIZED HEALTH CARE EDUCATION/TRAINING PROGRAM

## 2024-04-24 DEVICE — REPLAY HEMOSTASIS CLIP, 16MM SPAN
Type: IMPLANTABLE DEVICE | Site: RECTUM | Status: FUNCTIONAL
Brand: REPLAY

## 2024-04-24 RX ORDER — LIDOCAINE HYDROCHLORIDE 10 MG/ML
INJECTION, SOLUTION INFILTRATION; PERINEURAL AS NEEDED
Status: DISCONTINUED | OUTPATIENT
Start: 2024-04-24 | End: 2024-04-24 | Stop reason: SURG

## 2024-04-24 RX ORDER — LIDOCAINE HYDROCHLORIDE 10 MG/ML
0.5 INJECTION, SOLUTION INFILTRATION; PERINEURAL ONCE AS NEEDED
Status: DISCONTINUED | OUTPATIENT
Start: 2024-04-24 | End: 2024-04-24 | Stop reason: HOSPADM

## 2024-04-24 RX ORDER — PROPOFOL 10 MG/ML
INJECTION, EMULSION INTRAVENOUS AS NEEDED
Status: DISCONTINUED | OUTPATIENT
Start: 2024-04-24 | End: 2024-04-24 | Stop reason: SURG

## 2024-04-24 RX ORDER — SODIUM CHLORIDE, SODIUM LACTATE, POTASSIUM CHLORIDE, CALCIUM CHLORIDE 600; 310; 30; 20 MG/100ML; MG/100ML; MG/100ML; MG/100ML
1000 INJECTION, SOLUTION INTRAVENOUS CONTINUOUS
Status: DISCONTINUED | OUTPATIENT
Start: 2024-04-24 | End: 2024-04-24 | Stop reason: HOSPADM

## 2024-04-24 RX ORDER — SODIUM CHLORIDE 0.9 % (FLUSH) 0.9 %
10 SYRINGE (ML) INJECTION AS NEEDED
Status: DISCONTINUED | OUTPATIENT
Start: 2024-04-24 | End: 2024-04-24 | Stop reason: HOSPADM

## 2024-04-24 RX ADMIN — PROPOFOL 80 MG: 10 INJECTION, EMULSION INTRAVENOUS at 08:42

## 2024-04-24 RX ADMIN — PROPOFOL 200 MCG/KG/MIN: 10 INJECTION, EMULSION INTRAVENOUS at 08:42

## 2024-04-24 RX ADMIN — LIDOCAINE HYDROCHLORIDE 30 MG: 10 INJECTION, SOLUTION INFILTRATION; PERINEURAL at 08:42

## 2024-04-24 RX ADMIN — SODIUM CHLORIDE, POTASSIUM CHLORIDE, SODIUM LACTATE AND CALCIUM CHLORIDE 1000 ML: 600; 310; 30; 20 INJECTION, SOLUTION INTRAVENOUS at 08:17

## 2024-04-24 NOTE — DISCHARGE INSTRUCTIONS
POST CLIP INSTRUCTIONS    The Resolution 360 clip is a new device that has been developed to stop or prevent bleeding during GI endoscopic procedures. The device consists of a small metal clip that is attached to the end of an endoscope. The clip is designed to be deployed at the site of bleeding or site where possible bleeding could occur.  It compresses the blood vessel and stops the bleeding.      _______ clip(s) was placed in your body on ______________________ to control bleeding in your GI tract.  If scheduled for an MRI, please inform the technologist you should have an X-ray prior to your MRI to confirm the metal clip has passed.  ENDOSCOPY - EGD/COLONOSCOPY       ADULT CARE DISCHARGE  INSTRUCTIONS     Symptoms you may temporarily experience:      Sore Throat     Hoarseness     Bloating/Cramping     Dizziness     IV Irritation/tenderness     Gas or Belching     Slight fever     Small amount of blood in vomit or stool       Call Your Doctor for the following Problems: ______________________     ________________     Fever of 101 degrees or higher       Sharp abdominal  pain     Red streak up the arm from the IV site     Severe cramping        Large amount of blood in stool or vomit       Instructions for the next 24 hours after your Procedure:     Adult supervision     Do NOT drink any alcohol      Do not work today     NO important decisions     DO NOT sign any legal documents     You may shower/ bathe       DO NOT  DRIVE or operate machinery     Resume normal activity tomorrow       Discharge  Diet:     Avoid spicy/ greasy foods     Avoid any food that will cause more gas or bloating       *** Seek IMMEDIATE medical attention and call 911 if you develop symptoms such as:     Chest pain     Shortness of breath     Severe bleeding

## 2024-04-24 NOTE — ANESTHESIA POSTPROCEDURE EVALUATION
"Patient: Janel Stack    Procedure Summary       Date: 04/24/24 Room / Location: [facility]    Anesthesia Start: 0840 Anesthesia Stop: 0920    Procedure: COLONOSCOPY Diagnosis:       Positive colorectal cancer screening using Cologuard test      (Positive colorectal cancer screening using Cologuard test [R19.5])    Surgeons: Nicolas Peres MD Provider: Leda Collazo MD    Anesthesia Type: MAC ASA Status: 2            Anesthesia Type: MAC    Vitals  Vitals Value Taken Time   /74 04/24/24 0931   Temp 36.6 °C (97.8 °F) 04/24/24 0915   Pulse 67 04/24/24 0931   Resp 16 04/24/24 0929   SpO2 98 % 04/24/24 0931   Vitals shown include unfiled device data.        Post Anesthesia Care and Evaluation    Patient location during evaluation: PHASE II  Patient participation: complete - patient participated  Level of consciousness: awake  Pain management: adequate    Airway patency: patent  Anesthetic complications: No anesthetic complications    Cardiovascular status: acceptable  Respiratory status: acceptable  Hydration status: acceptable    Comments: /74   Pulse 66   Temp 36.6 °C (97.8 °F) (Temporal)   Resp 16   Ht 170.2 cm (67\")   Wt 77.3 kg (170 lb 6.4 oz)   SpO2 98%   BMI 26.69 kg/m²     "

## 2024-04-24 NOTE — ANESTHESIA PREPROCEDURE EVALUATION
Anesthesia Evaluation     Patient summary reviewed                Airway   Mallampati: II  Dental - normal exam     Pulmonary - normal exam   (+) a smoker Current,  Cardiovascular - normal exam    ECG reviewed    (+) hyperlipidemia  (-) hypertension      Neuro/Psych  (+) numbness, psychiatric history Anxiety  GI/Hepatic/Renal/Endo    (-) diabetes, no thyroid disorder    Musculoskeletal     Abdominal    Substance History   (+) alcohol use     OB/GYN          Other                    Anesthesia Plan    ASA 2     MAC       Anesthetic plan, risks, benefits, and alternatives have been provided, discussed and informed consent has been obtained with: patient.    CODE STATUS:

## 2024-04-24 NOTE — H&P
"Patient Care Team:  Florencia Aguirre APRN as PCP - General (Family Medicine)    CHIEF COMPLAINT:  For positive cologuard.     HISTORY OF PRESENT ILLNESS:  For positive cologuard.     Past Medical History:   Diagnosis Date    Alcoholic 2021    Cigarette nicotine dependence with nicotine-induced disorder 2021    Mood disorder 2021    Osteopenia 2021     Past Surgical History:   Procedure Laterality Date    CATARACT EXTRACTION Bilateral      SECTION          EYE SURGERY       Family History   Problem Relation Age of Onset    Pancreatic cancer Mother     Multiple sclerosis Father      Social History     Tobacco Use    Smoking status: Every Day     Current packs/day: 1.00     Types: Cigarettes    Smokeless tobacco: Never    Tobacco comments:     off and on for 40+ years   Substance Use Topics    Alcohol use: Yes     Alcohol/week: 5.0 standard drinks of alcohol     Types: 5 Glasses of wine per week     Comment: 3 A DAY- 15 PER WEEK - PT REPORTS HAS BEEN DRINKING A BOTTLE OF WINE EVERY NIGHT    Drug use: Not Currently     Medications Prior to Admission   Medication Sig Dispense Refill Last Dose    atorvastatin (LIPITOR) 10 MG tablet Take 1 tablet by mouth Daily. 90 tablet 1 More than a month     Allergies:  Patient has no known allergies.    REVIEW OF SYSTEMS:  Please see the above history of present illness for pertinent positives and negatives.  The remainder of the patient's systems have been reviewed and are negative.     Vital Signs  Temp:  [97.1 °F (36.2 °C)] 97.1 °F (36.2 °C)  Heart Rate:  [55] 55  Resp:  [16] 16  BP: (131)/(70) 131/70    Flowsheet Rows      Flowsheet Row First Filed Value   Admission Height 170.2 cm (67\") Documented at 2024 1206   Admission Weight 77.1 kg (170 lb) Documented at 2024 1206             Physical Exam:  Physical Exam   Constitutional: Patient appears well-developed and well-nourished and in no acute distress   HEENT:   Head: Normocephalic and " atraumatic.   Eyes:  Pupils are equal, round, and reactive to light. EOM are intact. Sclerae are anicteric and non-injected.  Mouth and Throat: Patient has moist mucous membranes. Oropharynx is clear of any erythema or exudate.     Neck: Neck supple. No JVD present. No thyromegaly present. No lymphadenopathy present.  Cardiovascular: Regular rate, regular rhythm, S1 normal and S2 normal.  Exam reveals no gallop and no friction rub.  No murmur heard.  Pulmonary/Chest: Lungs are clear to auscultation bilaterally. No respiratory distress. No wheezes. No rhonchi. No rales.   Abdominal: Soft. Bowel sounds are normal. No distension and no mass. There is no hepatosplenomegaly. There is no tenderness.   Musculoskeletal: Normal Muscle tone  Extremities: No edema. Pulses are palpable in all 4 extremities.  Neurological: Patient is alert and oriented to person, place, and time. Cranial nerves II-XII are grossly intact with no focal deficits.  Skin: Skin is warm. No rash noted. Nails show no clubbing.  No cyanosis or erythema.    Debilities/Disabilities Identified: None  Emotional Behavior: Appropriate     Results Review:   I reviewed the patient's new clinical results.    Lab Results (most recent)       None            Imaging Results (Most Recent)       None          reviewed    ECG/EMG Results (most recent)       None          reviewed    Assessment & Plan   For positive cologuard.  /  colonoscopy      I discussed the patient's findings and my recommendations with patient.     Nicolas Peres MD  04/24/24  08:39 EDT    Time: 10 min prior to procedure.

## 2024-04-26 LAB
LAB AP CASE REPORT: NORMAL
LAB AP CLINICAL INFORMATION: NORMAL
PATH REPORT.FINAL DX SPEC: NORMAL
PATH REPORT.GROSS SPEC: NORMAL

## 2024-04-30 ENCOUNTER — TELEPHONE (OUTPATIENT)
Dept: FAMILY MEDICINE CLINIC | Facility: CLINIC | Age: 64
End: 2024-04-30

## 2024-04-30 NOTE — TELEPHONE ENCOUNTER
Caller: Janel Stack    Relationship to patient: Self    Best call back number: [phone]    Patient is needing:     CALLING TO GET COLONOSCOPY RESULTS.

## 2024-05-01 ENCOUNTER — TELEPHONE (OUTPATIENT)
Dept: FAMILY MEDICINE CLINIC | Facility: CLINIC | Age: 64
End: 2024-05-01
Payer: COMMERCIAL

## 2024-05-02 DIAGNOSIS — M54.41 CHRONIC BILATERAL LOW BACK PAIN WITH BILATERAL SCIATICA: Primary | ICD-10-CM

## 2024-05-02 DIAGNOSIS — G89.29 CHRONIC BILATERAL LOW BACK PAIN WITH BILATERAL SCIATICA: Primary | ICD-10-CM

## 2024-05-02 DIAGNOSIS — M54.42 CHRONIC BILATERAL LOW BACK PAIN WITH BILATERAL SCIATICA: Primary | ICD-10-CM

## 2024-05-08 ENCOUNTER — TELEPHONE (OUTPATIENT)
Dept: GASTROENTEROLOGY | Facility: CLINIC | Age: 64
End: 2024-05-08

## 2024-05-08 NOTE — TELEPHONE ENCOUNTER
Hub staff attempted to follow warm transfer process and was unsuccessful     Caller: Janel Stack    Relationship to patient: Self    Best call back number: [phone]    Patient is needing: PT CALLED TO GET RESULTS OF 4/24 CLS

## 2024-08-29 ENCOUNTER — TELEPHONE (OUTPATIENT)
Dept: FAMILY MEDICINE CLINIC | Facility: CLINIC | Age: 64
End: 2024-08-29

## 2025-02-12 ENCOUNTER — PREP FOR SURGERY (OUTPATIENT)
Dept: SURGERY | Facility: SURGERY CENTER | Age: 65
End: 2025-02-12
Payer: COMMERCIAL

## 2025-02-12 DIAGNOSIS — Z12.11 ENCOUNTER FOR SCREENING COLONOSCOPY: ICD-10-CM

## 2025-02-12 DIAGNOSIS — Z86.0100 HISTORY OF COLON POLYPS: Primary | ICD-10-CM

## 2025-02-12 RX ORDER — SODIUM CHLORIDE 0.9 % (FLUSH) 0.9 %
10 SYRINGE (ML) INJECTION AS NEEDED
OUTPATIENT
Start: 2025-02-12

## 2025-02-12 RX ORDER — SODIUM CHLORIDE 0.9 % (FLUSH) 0.9 %
3 SYRINGE (ML) INJECTION EVERY 12 HOURS SCHEDULED
OUTPATIENT
Start: 2025-02-12

## 2025-02-12 RX ORDER — SODIUM CHLORIDE, SODIUM LACTATE, POTASSIUM CHLORIDE, CALCIUM CHLORIDE 600; 310; 30; 20 MG/100ML; MG/100ML; MG/100ML; MG/100ML
30 INJECTION, SOLUTION INTRAVENOUS CONTINUOUS PRN
OUTPATIENT
Start: 2025-02-12 | End: 2025-02-13

## (undated) DEVICE — CANN O2 ETCO2 FITS ALL CONN CO2 SMPL A/ 7IN DISP LF

## (undated) DEVICE — THE SINGLE USE ETRAP – POLYP TRAP IS USED FOR SUCTION RETRIEVAL OF ENDOSCOPICALLY REMOVED POLYPS.: Brand: ETRAP

## (undated) DEVICE — GOWN ISOL W/THUMB UNIV BLU BX/15

## (undated) DEVICE — LASSO POLYPECTOMY SNARE: Brand: LASSO

## (undated) DEVICE — VIAL FORMLN CAP 10PCT 20ML

## (undated) DEVICE — KT ORCA ORCAPOD DISP STRL

## (undated) DEVICE — GOWN SURG ENDOARMOR LVL3 UNIV KNT/CUF DISP NS

## (undated) DEVICE — SUREFIT, DUAL DISPERSIVE ELECTRODE, CONTACT QUALITY MONITOR: Brand: SUREFIT

## (undated) DEVICE — SNAR POLYP CAPTIVATOR/COLD STFF RND 10MM 240CM

## (undated) DEVICE — ADAPT CLN SCPE ENDO PORPOISE BX/50 DISP

## (undated) DEVICE — Device

## (undated) DEVICE — FLEX ADVANTAGE 1500CC: Brand: FLEX ADVANTAGE